# Patient Record
Sex: FEMALE | Race: OTHER | HISPANIC OR LATINO
[De-identification: names, ages, dates, MRNs, and addresses within clinical notes are randomized per-mention and may not be internally consistent; named-entity substitution may affect disease eponyms.]

---

## 2019-06-12 ENCOUNTER — APPOINTMENT (OUTPATIENT)
Dept: OBGYN | Facility: CLINIC | Age: 29
End: 2019-06-12
Payer: MEDICAID

## 2019-06-12 VITALS
WEIGHT: 113 LBS | SYSTOLIC BLOOD PRESSURE: 109 MMHG | HEART RATE: 77 BPM | HEIGHT: 60 IN | BODY MASS INDEX: 22.19 KG/M2 | DIASTOLIC BLOOD PRESSURE: 74 MMHG

## 2019-06-12 DIAGNOSIS — Z78.9 OTHER SPECIFIED HEALTH STATUS: ICD-10-CM

## 2019-06-12 DIAGNOSIS — Z80.9 FAMILY HISTORY OF MALIGNANT NEOPLASM, UNSPECIFIED: ICD-10-CM

## 2019-06-12 LAB
HCG UR QL: POSITIVE
QUALITY CONTROL: YES

## 2019-06-12 PROCEDURE — 99204 OFFICE O/P NEW MOD 45 MIN: CPT

## 2019-06-12 PROCEDURE — 81025 URINE PREGNANCY TEST: CPT

## 2019-06-12 NOTE — COUNSELING
[Nutrition] : nutrition [Breast Self Exam] : breast self exam [Exercise] : exercise [Vitamins/Supplements] : vitamins/supplements [STD (testing, results, tx)] : STD (testing, results, tx)

## 2019-06-13 LAB
C TRACH RRNA SPEC QL NAA+PROBE: NOT DETECTED
HPV HIGH+LOW RISK DNA PNL CVX: NOT DETECTED
N GONORRHOEA RRNA SPEC QL NAA+PROBE: NOT DETECTED
SOURCE TP AMPLIFICATION: NORMAL

## 2019-06-17 ENCOUNTER — APPOINTMENT (OUTPATIENT)
Dept: OBGYN | Facility: CLINIC | Age: 29
End: 2019-06-17
Payer: MEDICAID

## 2019-06-17 ENCOUNTER — NON-APPOINTMENT (OUTPATIENT)
Age: 29
End: 2019-06-17

## 2019-06-17 VITALS
HEIGHT: 60 IN | DIASTOLIC BLOOD PRESSURE: 69 MMHG | WEIGHT: 114.13 LBS | BODY MASS INDEX: 22.41 KG/M2 | HEART RATE: 80 BPM | SYSTOLIC BLOOD PRESSURE: 108 MMHG

## 2019-06-17 LAB — CYTOLOGY CVX/VAG DOC THIN PREP: NORMAL

## 2019-06-17 PROCEDURE — 99213 OFFICE O/P EST LOW 20 MIN: CPT | Mod: TH

## 2019-06-17 NOTE — PHYSICAL EXAM
[Awake] : awake [Alert] : alert [Soft] : soft [Oriented x3] : oriented to person, place, and time [Normal] : cervix [No Bleeding] : there was no active vaginal bleeding [Enlarged ___ wks] : enlarged [unfilled] ~Uweeks [Uterine Adnexae] : were not tender and not enlarged [Acute Distress] : no acute distress [Mass] : no breast mass [Nipple Discharge] : no nipple discharge [Tender] : non tender [Axillary LAD] : no axillary lymphadenopathy

## 2019-06-25 ENCOUNTER — APPOINTMENT (OUTPATIENT)
Dept: MATERNAL FETAL MEDICINE | Facility: CLINIC | Age: 29
End: 2019-06-25
Payer: MEDICAID

## 2019-06-25 ENCOUNTER — ASOB RESULT (OUTPATIENT)
Age: 29
End: 2019-06-25

## 2019-06-25 ENCOUNTER — APPOINTMENT (OUTPATIENT)
Dept: ANTEPARTUM | Facility: CLINIC | Age: 29
End: 2019-06-25
Payer: MEDICAID

## 2019-06-25 ENCOUNTER — LABORATORY RESULT (OUTPATIENT)
Age: 29
End: 2019-06-25

## 2019-06-25 VITALS
DIASTOLIC BLOOD PRESSURE: 62 MMHG | SYSTOLIC BLOOD PRESSURE: 96 MMHG | BODY MASS INDEX: 22.19 KG/M2 | HEART RATE: 68 BPM | WEIGHT: 113 LBS | HEIGHT: 60 IN

## 2019-06-25 DIAGNOSIS — Z83.49 FAMILY HISTORY OF OTHER ENDOCRINE, NUTRITIONAL AND METABOLIC DISEASES: ICD-10-CM

## 2019-06-25 DIAGNOSIS — Z82.49 FAMILY HISTORY OF ISCHEMIC HEART DISEASE AND OTHER DISEASES OF THE CIRCULATORY SYSTEM: ICD-10-CM

## 2019-06-25 DIAGNOSIS — N91.1 SECONDARY AMENORRHEA: ICD-10-CM

## 2019-06-25 PROCEDURE — 76801 OB US < 14 WKS SINGLE FETUS: CPT

## 2019-06-25 PROCEDURE — 36416 COLLJ CAPILLARY BLOOD SPEC: CPT

## 2019-06-25 PROCEDURE — 99203 OFFICE O/P NEW LOW 30 MIN: CPT | Mod: TH

## 2019-06-25 PROCEDURE — 76813 OB US NUCHAL MEAS 1 GEST: CPT

## 2019-06-25 NOTE — FAMILY HISTORY
[Age 35+ During Pregnancy] : not 35 or over during pregnancy [Reported Family History Of Birth Defects] : no congenital heart defects [Clifford-Sachs Carrier] : no Clifford-Sachs [Family History] : no mental retardation/autism [Reported Family History Of Genetic Disease] : no maternal metabolic disorder

## 2019-06-25 NOTE — SURGICAL HISTORY
[Fibroids] : no fibroids [Abn Paps] : no abnormal pap smears [STI's] : no STI's [Breast Disease] : no breast disease [Infertility] : no infertility [Cysts] : no cysts [OC Use] : no OC use

## 2019-06-25 NOTE — DISCUSSION/SUMMARY
[FreeTextEntry1] : She is 13 weeks and one day gestation by her last menstrual period dates.\par \par I told her that the majority of published studies have found that women who use oral contraceptives during early pregnancy have no increased risk for most types of major congenital malformations. I discussed the general topic of prenatal diagnosis. She was told that all pregnancies have a 2 to 3% risk of having a baby born with a birth defect, and a 1 to 2 % risk of having a baby born with developmental problems that cannot be diagnosed during pregnancy. I told her that there are two types of birth defects, structural and chromosomal. She was made aware that prenatal diagnosis is available to determine whether the fetus she is carrying has normal or abnormal chromosomes, and major fetal structural abnormalities. I discussed the various screening and diagnostic tests used for identifying fetal chromosomal abnormalities. I explained the difference between screening and diagnostic tests.  She was offered diagnostic testing with chorionic villus sampling or a genetic amniocentesis.  She was made aware of the small risk of miscarriage associated with the diagnostic procedures, the limitations of the procedures, and the alternative of not having the procedures.  All of her questions were answered. She decided not to have a diagnostic test at this time. She decided to have a first trimester screening test which was done today. She was offered genetic counseling. She consented to have genetic counseling and was scheduled to see our genetic counselor as soon as possible.  She was scheduled to have a detailed fetal anatomy ultrasound examination between 18 and 20 weeks of gestation to exclude major fetal structural abnormalities.   \par \par

## 2019-06-25 NOTE — PAST MEDICAL HISTORY
[Exposure To Gonorrhea] : no gonorrhea [Chlamydial Infections] : no chlamydia [Syphilis] : no syphilis [HIV Infection] : no HIV [Herpes Simplex] : no genital herpes [Human Papilloma Virus Infection] : no genital warts [Hepatitis, B Virus] : no Hepatitis B [Hepatitis, C Virus] : no Hepatitis C [Trichomoniasis] : no trichomoniasis

## 2019-06-25 NOTE — VITALS
[GA =___ Weeks] : which calculates to a GA of [unfilled] weeks [LMP (date): ___] : LMP was on [unfilled] [MARGOT by LMP (date): ___] : The calculated MARGOT by LMP is [unfilled] [GA= ___ Days] : and [unfilled] day(s) [By LMP] : this is the final MARGOT

## 2019-06-25 NOTE — ACTIVE PROBLEMS
[Diabetes Mellitus] : no diabetes mellitus [Autoimmune Disease] : no autoimmune disease [Hypertension] : no hypertension [Heart Disease] : no heart disease [Psychiatric Disorders] : no psychiatric disorders [Renal Disease] : no kidney disease, no UTI [Neurologic Disorder] : no neurologic disorder, no epilepsy [Depression] : no depression, no post partum depression [Thrombophlebitis] : no varicosities, no phlebitis [Hepatic Disorder] : no hepatitis, no liver disease [Trauma] : no trauma/violence [Thyroid Disorder] : no thyroid dysfunction

## 2019-06-25 NOTE — OB HISTORY
[Pregnancy History] : girl [___] : pregnancy complications occured [MARGOT: ___] : MARGOT: [unfilled] [LMP: ___] : LMP: [unfilled] [EGA: ___ wks] : EGA: [unfilled] wks [FreeTextEntry1] : First prenatal visit was June 17, 2019. No prenatal laboratory tests unavailable for my review. [Spontaneous] : Spontaneous conception [Definite:  ___ (Date)] : the last menstrual period was [unfilled] [Normal Amount/Duration] : was of a normal amount and duration [Regular Cycle Intervals] : periods have been regular [Spotting Between  Menses] : no spotting between menses [Menstrual Cramps] : menstrual cramps

## 2019-06-26 ENCOUNTER — APPOINTMENT (OUTPATIENT)
Dept: OBGYN | Facility: CLINIC | Age: 29
End: 2019-06-26
Payer: MEDICAID

## 2019-06-26 ENCOUNTER — TRANSCRIPTION ENCOUNTER (OUTPATIENT)
Age: 29
End: 2019-06-26

## 2019-06-26 ENCOUNTER — NON-APPOINTMENT (OUTPATIENT)
Age: 29
End: 2019-06-26

## 2019-06-26 VITALS
WEIGHT: 113 LBS | HEART RATE: 88 BPM | SYSTOLIC BLOOD PRESSURE: 104 MMHG | DIASTOLIC BLOOD PRESSURE: 72 MMHG | BODY MASS INDEX: 18.83 KG/M2 | HEIGHT: 65 IN

## 2019-06-26 DIAGNOSIS — Z3A.13 13 WEEKS GESTATION OF PREGNANCY: ICD-10-CM

## 2019-06-26 DIAGNOSIS — Z78.9 OTHER SPECIFIED HEALTH STATUS: ICD-10-CM

## 2019-06-26 PROCEDURE — 99213 OFFICE O/P EST LOW 20 MIN: CPT | Mod: TH

## 2019-06-26 RX ORDER — PRENATAL VIT NO.130/IRON/FOLIC 27MG-0.8MG
27-0.8 TABLET ORAL
Qty: 30 | Refills: 0 | Status: COMPLETED | COMMUNITY
Start: 2019-06-12

## 2019-06-27 ENCOUNTER — LABORATORY RESULT (OUTPATIENT)
Age: 29
End: 2019-06-27

## 2019-06-27 LAB
APPEARANCE: CLEAR
BASOPHILS # BLD AUTO: 0.05 K/UL
BASOPHILS NFR BLD AUTO: 0.5 %
BILIRUBIN URINE: NEGATIVE
BLOOD URINE: NEGATIVE
COLOR: YELLOW
EOSINOPHIL # BLD AUTO: 1 K/UL
EOSINOPHIL NFR BLD AUTO: 10.7 %
ESTIMATED AVERAGE GLUCOSE: 100 MG/DL
GLUCOSE QUALITATIVE U: NEGATIVE
HBA1C MFR BLD HPLC: 5.1 %
HBV SURFACE AG SER QL: NONREACTIVE
HCT VFR BLD CALC: 35.1 %
HCV AB SER QL: NONREACTIVE
HCV S/CO RATIO: 0.06 S/CO
HGB BLD-MCNC: 11.4 G/DL
HIV1+2 AB SPEC QL IA.RAPID: NONREACTIVE
IMM GRANULOCYTES NFR BLD AUTO: 0.1 %
KETONES URINE: NEGATIVE
LEUKOCYTE ESTERASE URINE: ABNORMAL
LYMPHOCYTES # BLD AUTO: 2.65 K/UL
LYMPHOCYTES NFR BLD AUTO: 28.3 %
MAN DIFF?: NORMAL
MCHC RBC-ENTMCNC: 28.1 PG
MCHC RBC-ENTMCNC: 32.5 GM/DL
MCV RBC AUTO: 86.5 FL
MONOCYTES # BLD AUTO: 0.55 K/UL
MONOCYTES NFR BLD AUTO: 5.9 %
NEUTROPHILS # BLD AUTO: 5.11 K/UL
NEUTROPHILS NFR BLD AUTO: 54.5 %
NITRITE URINE: NEGATIVE
PH URINE: 6
PLATELET # BLD AUTO: 305 K/UL
PROTEIN URINE: ABNORMAL
RBC # BLD: 4.06 M/UL
RBC # FLD: 14.9 %
SPECIFIC GRAVITY URINE: 1.03
TSH SERPL-ACNC: 0.71 UIU/ML
UROBILINOGEN URINE: NORMAL
WBC # FLD AUTO: 9.37 K/UL

## 2019-06-28 LAB
ABO + RH PNL BLD: NORMAL
B19V IGG SER QL IA: 0.3 INDEX
B19V IGG+IGM SER-IMP: NEGATIVE
B19V IGG+IGM SER-IMP: NORMAL
B19V IGM FLD-ACNC: 0.3
B19V IGM SER-ACNC: NEGATIVE
BLD GP AB SCN SERPL QL: NORMAL
CMV IGG SERPL QL: 3.8 U/ML
CMV IGG SERPL-IMP: POSITIVE
MEV IGG FLD QL IA: 68.1 AU/ML
MEV IGG+IGM SER-IMP: POSITIVE
MUV AB SER-ACNC: POSITIVE
MUV IGG SER QL IA: 23.6 AU/ML
T GONDII AB SER-IMP: NEGATIVE
T GONDII IGG SER QL: <3 IU/ML
T PALLIDUM AB SER QL IA: NEGATIVE
VZV AB TITR SER: POSITIVE
VZV IGG SER IF-ACNC: 432.6 INDEX

## 2019-07-01 LAB
FMR1 GENE MUT ANL BLD/T: NORMAL
M TB IFN-G BLD-IMP: NEGATIVE
QUANTIFERON TB PLUS MITOGEN MINUS NIL: >10 IU/ML
QUANTIFERON TB PLUS NIL: 0.02 IU/ML
QUANTIFERON TB PLUS TB1 MINUS NIL: 0 IU/ML
QUANTIFERON TB PLUS TB2 MINUS NIL: 0 IU/ML
RUBV IGG FLD-ACNC: 1.1 INDEX
RUBV IGG SER-IMP: POSITIVE

## 2019-07-03 ENCOUNTER — ASOB RESULT (OUTPATIENT)
Age: 29
End: 2019-07-03

## 2019-07-03 ENCOUNTER — APPOINTMENT (OUTPATIENT)
Dept: ANTEPARTUM | Facility: CLINIC | Age: 29
End: 2019-07-03

## 2019-07-03 ENCOUNTER — APPOINTMENT (OUTPATIENT)
Dept: MATERNAL FETAL MEDICINE | Facility: CLINIC | Age: 29
End: 2019-07-03
Payer: MEDICAID

## 2019-07-03 LAB
HGB A MFR BLD: 60.3 %
HGB A2 MFR BLD: 3.3 %
HGB FRACT BLD-IMP: NORMAL
HGB OTHER MFR BLD ELPH: 36.4 %
HGB S BLD QL SOLY: NEGATIVE

## 2019-07-03 PROCEDURE — 99215 OFFICE O/P EST HI 40 MIN: CPT | Mod: TH

## 2019-07-05 PROBLEM — Z3A.13 13 WEEKS GESTATION OF PREGNANCY: Status: RESOLVED | Noted: 2019-06-26 | Resolved: 2019-07-05

## 2019-07-05 LAB
AR GENE MUT ANL BLD/T: NEGATIVE
CFTR MUT TESTED BLD/T: NEGATIVE

## 2019-07-08 ENCOUNTER — APPOINTMENT (OUTPATIENT)
Dept: OBGYN | Facility: CLINIC | Age: 29
End: 2019-07-08
Payer: MEDICAID

## 2019-07-08 ENCOUNTER — NON-APPOINTMENT (OUTPATIENT)
Age: 29
End: 2019-07-08

## 2019-07-08 VITALS
HEIGHT: 65 IN | WEIGHT: 113 LBS | SYSTOLIC BLOOD PRESSURE: 110 MMHG | DIASTOLIC BLOOD PRESSURE: 88 MMHG | BODY MASS INDEX: 18.83 KG/M2

## 2019-07-08 DIAGNOSIS — Z3A.13 13 WEEKS GESTATION OF PREGNANCY: ICD-10-CM

## 2019-07-08 LAB
1ST TRIMESTER DATA: NORMAL
ADDENDUM DOC: NORMAL
AFP PNL SERPL: NORMAL
AFP SERPL-ACNC: NORMAL
CLINICAL BIOCHEMIST REVIEW: ABNORMAL
FREE BETA HCG 1ST TRIMESTER: NORMAL
Lab: NORMAL
NASAL BONE: PRESENT
NOTES NTD: NORMAL
NT: NORMAL
PAPP-A SERPL-ACNC: NORMAL
TRISOMY 18/3: NORMAL

## 2019-07-08 PROCEDURE — 99213 OFFICE O/P EST LOW 20 MIN: CPT | Mod: TH

## 2019-07-10 LAB — BACTERIA UR CULT: NORMAL

## 2019-07-29 ENCOUNTER — APPOINTMENT (OUTPATIENT)
Dept: OBGYN | Facility: CLINIC | Age: 29
End: 2019-07-29
Payer: MEDICAID

## 2019-07-29 ENCOUNTER — NON-APPOINTMENT (OUTPATIENT)
Age: 29
End: 2019-07-29

## 2019-07-29 ENCOUNTER — APPOINTMENT (OUTPATIENT)
Dept: ANTEPARTUM | Facility: CLINIC | Age: 29
End: 2019-07-29

## 2019-07-29 VITALS
HEIGHT: 61 IN | SYSTOLIC BLOOD PRESSURE: 104 MMHG | HEART RATE: 82 BPM | BODY MASS INDEX: 21.99 KG/M2 | DIASTOLIC BLOOD PRESSURE: 73 MMHG | WEIGHT: 116.5 LBS

## 2019-07-29 PROCEDURE — 99213 OFFICE O/P EST LOW 20 MIN: CPT | Mod: TH

## 2019-08-01 LAB
1ST TRIMESTER DATA: NORMAL
2ND TRIMESTER DATA: NORMAL
AFP PNL SERPL: NORMAL
AFP SERPL-ACNC: NORMAL
AFP SERPL-ACNC: NORMAL
B-HCG FREE SERPL-MCNC: NORMAL
CLINICAL BIOCHEMIST REVIEW: NORMAL
FREE BETA HCG 1ST TRIMESTER: NORMAL
INHIBIN A SERPL-MCNC: NORMAL
NASAL BONE: PRESENT
NOTES NTD: NORMAL
NT: NORMAL
PAPP-A SERPL-ACNC: NORMAL
U ESTRIOL SERPL-SCNC: NORMAL

## 2019-08-12 ENCOUNTER — ASOB RESULT (OUTPATIENT)
Age: 29
End: 2019-08-12

## 2019-08-12 ENCOUNTER — APPOINTMENT (OUTPATIENT)
Dept: ANTEPARTUM | Facility: CLINIC | Age: 29
End: 2019-08-12
Payer: MEDICAID

## 2019-08-12 PROCEDURE — 76817 TRANSVAGINAL US OBSTETRIC: CPT

## 2019-08-12 PROCEDURE — 76811 OB US DETAILED SNGL FETUS: CPT

## 2019-08-19 ENCOUNTER — APPOINTMENT (OUTPATIENT)
Dept: OBGYN | Facility: CLINIC | Age: 29
End: 2019-08-19
Payer: MEDICAID

## 2019-08-19 ENCOUNTER — NON-APPOINTMENT (OUTPATIENT)
Age: 29
End: 2019-08-19

## 2019-08-19 VITALS
HEART RATE: 80 BPM | BODY MASS INDEX: 22.67 KG/M2 | SYSTOLIC BLOOD PRESSURE: 99 MMHG | DIASTOLIC BLOOD PRESSURE: 69 MMHG | HEIGHT: 61 IN | WEIGHT: 120.06 LBS

## 2019-08-19 PROCEDURE — 99213 OFFICE O/P EST LOW 20 MIN: CPT | Mod: TH

## 2019-08-29 ENCOUNTER — OUTPATIENT (OUTPATIENT)
Dept: INPATIENT UNIT | Facility: HOSPITAL | Age: 29
LOS: 1 days | End: 2019-08-29
Payer: COMMERCIAL

## 2019-08-29 VITALS
DIASTOLIC BLOOD PRESSURE: 69 MMHG | TEMPERATURE: 99 F | HEART RATE: 79 BPM | RESPIRATION RATE: 16 BRPM | SYSTOLIC BLOOD PRESSURE: 109 MMHG

## 2019-08-29 VITALS — DIASTOLIC BLOOD PRESSURE: 65 MMHG | HEART RATE: 78 BPM | SYSTOLIC BLOOD PRESSURE: 101 MMHG

## 2019-08-29 DIAGNOSIS — O47.02 FALSE LABOR BEFORE 37 COMPLETED WEEKS OF GESTATION, SECOND TRIMESTER: ICD-10-CM

## 2019-08-29 DIAGNOSIS — Z3A.18 18 WEEKS GESTATION OF PREGNANCY: ICD-10-CM

## 2019-08-29 DIAGNOSIS — Z3A.15 15 WEEKS GESTATION OF PREGNANCY: ICD-10-CM

## 2019-08-29 DIAGNOSIS — Z3A.20 20 WEEKS GESTATION OF PREGNANCY: ICD-10-CM

## 2019-08-29 PROCEDURE — 59025 FETAL NON-STRESS TEST: CPT

## 2019-08-29 PROCEDURE — G0463: CPT

## 2019-08-29 NOTE — OB PROVIDER TRIAGE NOTE - HISTORY OF PRESENT ILLNESS
OLINDA LARSON is a 29yF  @22w6d by second trim sono (20wks). She presents to L&D for sharp pelvic pain with onset 1 day prior. The pain is 6-8/10 in intensity, it is located midline at the pelvic area without radiation. She reports it is worse with sitting upright and does not endorse alleviating factors. She denies any precipitating events such as vigorous exercise or sexual intercourse. She most recently engaged in intercourse 2 days prior to presentation.    Ctx: Denies  Mvmt: +  LOF: Denies  Vaginal bleeding: Denies    ROS: Endorses SOB/GERD with onset of several weeks ago. Otherwise negative.     Patient has been following up with [facility] for pre- care. No complications throughout current pregnancy.     PMH: Fe Deficient Anemia  PSH: None   Past OB  - 2006  Term (around 7lbs)  -    (35 wks (around 6 lbs))  -    (35 wks (around 7 lbs))    pGYN: Denies F/C/STI, 0 abn pap  Meds: PNV  All: NKDA  Social Hx: Denies alcohol, tobacco, drug use

## 2019-08-29 NOTE — OB PROVIDER TRIAGE NOTE - NSHPPHYSICALEXAM_GEN_ALL_CORE
PHYSICAL EXAM  Vital Signs Last 24 Hrs  T(C): 37 (29 Aug 2019 17:40), Max: 37 (29 Aug 2019 17:40)  T(F): 98.6 (29 Aug 2019 17:40), Max: 98.6 (29 Aug 2019 17:40)  HR: 79 (29 Aug 2019 17:41) (79 - 79)  BP: 109/69 (29 Aug 2019 17:41) (109/69 - 109/69)  BP(mean): --  RR: 16 (29 Aug 2019 17:40) (16 - 16)  SpO2: --    Gen: NAD  Resp: CTABL  Abdomen: Soft gravid abdomen, Minimal tenderness at midline pelvis  Extremities: No edema    FHR: 140    [place]: No ctx PHYSICAL EXAM  Vital Signs Last 24 Hrs  T(C): 37 (29 Aug 2019 17:40), Max: 37 (29 Aug 2019 17:40)  T(F): 98.6 (29 Aug 2019 17:40), Max: 98.6 (29 Aug 2019 17:40)  HR: 79 (29 Aug 2019 17:41) (79 - 79)  BP: 109/69 (29 Aug 2019 17:41) (109/69 - 109/69)  BP(mean): --  RR: 16 (29 Aug 2019 17:40) (16 - 16)  SpO2: --    Gen: NAD  Resp: CTABL  Abdomen: Soft gravid abdomen, Minimal tenderness at midline pelvis  Extremities: No edema    FHR: 140    [place]: No ctx      Cervical length = 3.17 cm

## 2019-08-29 NOTE — OB PROVIDER TRIAGE NOTE - NSOBPROVIDERNOTE_OBGYN_ALL_OB_FT
MARSHA OLINDA is a 29yF  @22w6d by second trim sono (20wks). She presents to L&D for sharp pelvic pain with onset 1 day prior. She most recently engaged in intercourse 2 days prior to presentation.    Plan  1. TVUS - Cervical Length  2. FFN not performed due to recent sexual intercourse OLINDA LARSON is a 29yF  @22w6d by second trim sono (20wks). She presents to L&D for sharp pelvic pain with onset 1 day prior. She most recently engaged in intercourse 2 days prior to presentation.     Plan  cervical length is not short. Pt to keep appointment on . Pelvic reset until she sees Dr. Lebron.

## 2019-09-05 ENCOUNTER — OUTPATIENT (OUTPATIENT)
Dept: INPATIENT UNIT | Facility: HOSPITAL | Age: 29
LOS: 1 days | End: 2019-09-05
Payer: COMMERCIAL

## 2019-09-05 VITALS — SYSTOLIC BLOOD PRESSURE: 98 MMHG | DIASTOLIC BLOOD PRESSURE: 57 MMHG | HEART RATE: 74 BPM

## 2019-09-05 VITALS — RESPIRATION RATE: 14 BRPM | TEMPERATURE: 98 F

## 2019-09-05 DIAGNOSIS — Z98.890 OTHER SPECIFIED POSTPROCEDURAL STATES: Chronic | ICD-10-CM

## 2019-09-05 DIAGNOSIS — O47.02 FALSE LABOR BEFORE 37 COMPLETED WEEKS OF GESTATION, SECOND TRIMESTER: ICD-10-CM

## 2019-09-05 LAB
ALBUMIN SERPL ELPH-MCNC: 3.4 G/DL — SIGNIFICANT CHANGE UP (ref 3.3–5.2)
ALP SERPL-CCNC: 65 U/L — SIGNIFICANT CHANGE UP (ref 40–120)
ALT FLD-CCNC: 34 U/L — HIGH
ANION GAP SERPL CALC-SCNC: 11 MMOL/L — SIGNIFICANT CHANGE UP (ref 5–17)
AST SERPL-CCNC: 28 U/L — SIGNIFICANT CHANGE UP
BASOPHILS # BLD AUTO: 0.05 K/UL — SIGNIFICANT CHANGE UP (ref 0–0.2)
BASOPHILS NFR BLD AUTO: 0.4 % — SIGNIFICANT CHANGE UP (ref 0–2)
BILIRUB SERPL-MCNC: <0.2 MG/DL — LOW (ref 0.4–2)
BUN SERPL-MCNC: 10 MG/DL — SIGNIFICANT CHANGE UP (ref 8–20)
CALCIUM SERPL-MCNC: 9.1 MG/DL — SIGNIFICANT CHANGE UP (ref 8.6–10.2)
CHLORIDE SERPL-SCNC: 106 MMOL/L — SIGNIFICANT CHANGE UP (ref 98–107)
CO2 SERPL-SCNC: 18 MMOL/L — LOW (ref 22–29)
CREAT SERPL-MCNC: 0.34 MG/DL — LOW (ref 0.5–1.3)
EOSINOPHIL # BLD AUTO: 0.62 K/UL — HIGH (ref 0–0.5)
EOSINOPHIL NFR BLD AUTO: 5.4 % — SIGNIFICANT CHANGE UP (ref 0–6)
GLUCOSE SERPL-MCNC: 80 MG/DL — SIGNIFICANT CHANGE UP (ref 70–115)
HCT VFR BLD CALC: 29.8 % — LOW (ref 34.5–45)
HGB BLD-MCNC: 9 G/DL — LOW (ref 11.5–15.5)
IMM GRANULOCYTES NFR BLD AUTO: 1.4 % — SIGNIFICANT CHANGE UP (ref 0–1.5)
LYMPHOCYTES # BLD AUTO: 1.86 K/UL — SIGNIFICANT CHANGE UP (ref 1–3.3)
LYMPHOCYTES # BLD AUTO: 16.1 % — SIGNIFICANT CHANGE UP (ref 13–44)
MCHC RBC-ENTMCNC: 26.6 PG — LOW (ref 27–34)
MCHC RBC-ENTMCNC: 30.2 GM/DL — LOW (ref 32–36)
MCV RBC AUTO: 88.2 FL — SIGNIFICANT CHANGE UP (ref 80–100)
MONOCYTES # BLD AUTO: 0.69 K/UL — SIGNIFICANT CHANGE UP (ref 0–0.9)
MONOCYTES NFR BLD AUTO: 6 % — SIGNIFICANT CHANGE UP (ref 2–14)
NEUTROPHILS # BLD AUTO: 8.17 K/UL — HIGH (ref 1.8–7.4)
NEUTROPHILS NFR BLD AUTO: 70.7 % — SIGNIFICANT CHANGE UP (ref 43–77)
PLATELET # BLD AUTO: 275 K/UL — SIGNIFICANT CHANGE UP (ref 150–400)
POTASSIUM SERPL-MCNC: 4.1 MMOL/L — SIGNIFICANT CHANGE UP (ref 3.5–5.3)
POTASSIUM SERPL-SCNC: 4.1 MMOL/L — SIGNIFICANT CHANGE UP (ref 3.5–5.3)
PROT SERPL-MCNC: 6.4 G/DL — LOW (ref 6.6–8.7)
RBC # BLD: 3.38 M/UL — LOW (ref 3.8–5.2)
RBC # FLD: 15.9 % — HIGH (ref 10.3–14.5)
SODIUM SERPL-SCNC: 135 MMOL/L — SIGNIFICANT CHANGE UP (ref 135–145)
WBC # BLD: 11.55 K/UL — HIGH (ref 3.8–10.5)
WBC # FLD AUTO: 11.55 K/UL — HIGH (ref 3.8–10.5)

## 2019-09-05 PROCEDURE — 85027 COMPLETE CBC AUTOMATED: CPT

## 2019-09-05 PROCEDURE — 36415 COLL VENOUS BLD VENIPUNCTURE: CPT

## 2019-09-05 PROCEDURE — 76805 OB US >/= 14 WKS SNGL FETUS: CPT

## 2019-09-05 PROCEDURE — 80053 COMPREHEN METABOLIC PANEL: CPT

## 2019-09-05 PROCEDURE — 59025 FETAL NON-STRESS TEST: CPT

## 2019-09-05 PROCEDURE — 76805 OB US >/= 14 WKS SNGL FETUS: CPT | Mod: 26

## 2019-09-05 PROCEDURE — G0463: CPT

## 2019-09-05 PROCEDURE — 93010 ELECTROCARDIOGRAM REPORT: CPT

## 2019-09-05 PROCEDURE — 93005 ELECTROCARDIOGRAM TRACING: CPT

## 2019-09-05 PROCEDURE — 99223 1ST HOSP IP/OBS HIGH 75: CPT

## 2019-09-05 RX ORDER — DOCUSATE SODIUM 100 MG
1 CAPSULE ORAL
Qty: 30 | Refills: 0
Start: 2019-09-05 | End: 2019-10-04

## 2019-09-05 NOTE — OB PROVIDER TRIAGE NOTE - NSHPLABSRESULTS_GEN_ALL_CORE
CBC: Pending  CMP: Pending  Sono: Pending  EKG: Pending 9.0    11.55 )-----------( 275      ( 05 Sep 2019 16:16 )             29.8   09-05    135  |  106  |  10.0  ----------------------------<  80  4.1   |  18.0<L>  |  0.34<L>    Ca    9.1      05 Sep 2019 16:16    TPro  6.4<L>  /  Alb  3.4  /  TBili  <0.2<L>  /  DBili  x   /  AST  28  /  ALT  34<H>  /  AlkPhos  65  09-05      Sono: Pending  EKG: Pending

## 2019-09-05 NOTE — OB PROVIDER TRIAGE NOTE - NSOBPROVIDERNOTE_OBGYN_ALL_OB_FT
Ms Roslyn Davila is a 30yo  at 23w and 6d presenting to L&D s/p loss of consciousness at a  at 13:00 today.    -Continue to monitor  - Ms Roslyn Davila is a 30yo  at 23wk6d presenting to L&D s/p loss of consciousness at a  at 13:00 today.     Plan:  - Patient currently stable at bedside; currently reports good fetal movement  - Will send for CBC, CMP  - Plan for EKG and OB ultrasound   - Will continue to monitor Ms Roslyn Davila is a 28yo  at 23wk6d presenting to L&D s/p loss of consciousness at a  at 13:00 today.     Plan:  - Patient currently stable at bedside; currently reports good fetal movement  - CBC, CMP within normal limits  - Neurology consult: No neurological issues at this time  - Plan for EKG and OB ultrasound   - Will continue to monitor Ms Roslyn Davila is a 28yo  at 23wk6d presenting to L&D s/p loss of consciousness at a  at 13:00 today.     Plan:  - Patient currently stable at bedside; currently reports good fetal movement  - CBC, CMP  - EKG  - OB ultrasound   - MFM consult  - Will continue to monitor    Addendum:  AVVS  OB SONO: WNL  EKG: sinus rhythm  CBC, CMP - anemia, otherwise within normal limits in pregnancy  MFM on call was reached with the results of the tests, who recommended neurology consult to rule out neurologic causes for syncopal episode.  Pt was examined by Dr. Schrader the neurologist on call, who ruled out any neurological pathology and suggested vasovagal episode as a cause for syncopal episode given patient's symptoms before the episode.  Pt was discharged with PO Iron for anemia and with recommendations to increase po sodium intake to increase the blood pressure.  Pt will be seen by Dr. Lebron next Monday.  Dr. Ortiz signed off on the tracing.

## 2019-09-05 NOTE — OB RN TRIAGE NOTE - NS_PAINMANGEPLANS_OBGYN_ALL_OB
"Writer received a call from pts mother Dunia Crowell. Pt's mother expressed frustration about the pt having the right to refuse visits. Prior to this call pt had refused a visit from his father. Pt's mother indicates this is \"counterproductive considering his diagnosis of RAD.\" Pts mother expressed further frustration about the pt's refusing to visit as the planned discharge disposition is home. Writer sympathized with pt's mother and encouraged her to reach out to the pt's doctor to schedule a family therapy session to help facilitate this. Pts mother expressed they have gone through \"9 years of extensive therapy\" but was willing to entertain the idea of sessions here prior to discharge.  In addition, pt's mother indicated certain privileges (legos and a drone) which were to be negotiated during the visit the pt refused with father, will now be given to the pt's siblings due to pt refusing the visit.    " Breathing/Relaxation

## 2019-09-05 NOTE — CONSULT NOTE ADULT - SUBJECTIVE AND OBJECTIVE BOX
[facility]                                     Neurology at [place]                                 Macrina Rodriguez, & Florentin                                  [address]                                        [place], [place], [address]                                             [phone]    CC: syncope  HPI: The patient is a 29y Female (23 6/7 weeks pregnant) who presented with syncope.  She was standing in line at a [employer] and felt a chill-wave come over her, felt light-headed and her vision started to fade like a curtain falling and her hearing faded out as well.  Someone in line wa able to catch her from falling. She awoke sitting in a chair.  There was no convulsions, tongue bite or incontinence.  She is feeling well now.  Neuro evaluation is requested.    PAST MEDICAL & SURGICAL HISTORY:  Anemia  Spontaneous : x1 2017  History of D&C: 2017      MEDICATIONS  (STANDING):    MEDICATIONS  (PRN):      Allergies    No Known Allergies    Intolerances        SOCIAL HISTORY:  no tob,   no alcohol   no drugs    FAMILY HISTORY:  n/c      ROS: 14 point ROS negative other than what is present in HPI or below  syncope earlier, no complaints now    Vital Signs Last 24 Hrs  T(C): 36.9 (05 Sep 2019 15:01), Max: 36.9 (05 Sep 2019 15:01)  T(F): 98.42 (05 Sep 2019 15:01), Max: 98.42 (05 Sep 2019 15:01)  HR: 74 (05 Sep 2019 16:48) (74 - 82)  BP: 98/57 (05 Sep 2019 16:48) (96/60 - 103/65)  BP(mean): --  RR: 18 (05 Sep 2019 15:44) (14 - 18)  SpO2: 100% (05 Sep 2019 15:08) (99% - 100%)      General: NAD    Detailed Neurologic Exam:    Mental status: The patient is awake and alert and has normal attention span.  The patient is fully oriented in 3 spheres. The patient is oriented to current events. The patient is able to name objects, follow commands, repeat sentences.    Cranial nerves: Pupils equal and react symmetrically to light. There is no visual field deficit to confrontation. Extraocular motion is full with no nystagmus. There is no ptosis. Facial sensation is intact. Facial musculature is symmetric. Palate elevates symmetrically.  Tongue is midline.    Motor: There is normal bulk and tone.  There is no tremor.  Strength is 5/5 in the right arm and leg.   Strength is 5/5 in the left arm and leg.    Sensation: Intact to light touch and pin in 4 extremities    Reflexes: 2+ throughout and plantar responses are flexor.    Cerebellar: There is no dysmetria on finger to nose testing.    Gait : deferred    LABS:                         9.0    11.55 )-----------( 275      ( 05 Sep 2019 16:16 )             29.8           135  |  106  |  10.0  ----------------------------<  80  4.1   |  18.0<L>  |  0.34<L>    Ca    9.1      05 Sep 2019 16:16    TPro  6.4<L>  /  Alb  3.4  /  TBili  <0.2<L>  /  DBili  x   /  AST  28  /  ALT  34<H>  /  AlkPhos  65  -      RADIOLOGY & ADDITIONAL STUDIES (independently reviewed unless otherwise noted):  no neuro studies

## 2019-09-05 NOTE — OB PROVIDER TRIAGE NOTE - HISTORY OF PRESENT ILLNESS
Ms Roslyn Davila is a 30yo  at 23w and 6d presenting to L&D s/p loss of consciousness at a  at 13:00 today. Patient reports that prior to passing out she had a headache, loss of hearing and loss of vision. She reports she does not know the duration of her loss of consciousness, but when she awakened she was in a chair. Patient reports that someone caught her and she did not fall to the floor. She denies any LOF, contractions, bleeding and endorses fetal movement. Patient also reports some sharp abdominal discomfort since last week, which was attributed to stretching of her uterine ligaments. Patient has no other complaints at this time.    PMH: Anemia  PSH: Denies  PGYN: Denies  Allergies: NKDA  Meds: PNV Ms Roslyn Davila is a 28yo  at [id number] presenting to L&D s/p loss of consciousness at a  at 13:00 today.  Patient reports that prior to passing out she experienced a cold chills passing through her body, and tunneling of her vision at which time she believes she lost consciousness. Patient reports that someone caught her as she was falling and she did not suffer any abdominal trauma. She reports she does not know the duration of her loss of consciousness, but when she awakened she was in a chair. She denies any LOF, contractions, bleeding and endorses good fetal movement. Patient has no other complaints at this time.    PMH: Fe Deficient Anemia  PSH: None   Past OB  - 2006  Term (around 7lbs)  -    (35 wks (around 6 lbs))  -    (35 wks (around 7 lbs))  -  MAB (s/p d&c)    pGYN: Denies F/C/STI, 0 abn pap  Meds: PNV  All: NKDA  Social Hx: Denies alcohol, tobacco, drug use

## 2019-09-05 NOTE — CONSULT NOTE ADULT - ASSESSMENT
The patient is a 29y Female who is followed by neurology because of syncope    Syncope  possibly vasovagal  is hypotensive in unit  Was standing in line, lightheaded  no signs of seizure    liberalize salt in diet  consider compression stockings if edema occurs  if recurs consider cardiology eval,     no inpatient neuro workup suggested at this time    d/w OB resident on call    Thank you for allowing me to participate in the care of your patient    Jarett Schrader MD, PhD   [phone]

## 2019-09-05 NOTE — OB PROVIDER TRIAGE NOTE - NS_OBGYNHISTORY_OBGYN_ALL_OB_FT
- 2006  Term (around 7lbs)  -    (35 wks (around 6 lbs))  -    (35 wks (around 7 lbs)  - MAB and D&C

## 2019-09-05 NOTE — OB PROVIDER TRIAGE NOTE - NSHPPHYSICALEXAM_GEN_ALL_CORE
Vital Signs Last 24 Hrs  T(C): 36.8 (05 Sep 2019 13:50), Max: 36.8 (05 Sep 2019 13:46)  T(F): 98.2 (05 Sep 2019 13:50), Max: 98.24 (05 Sep 2019 13:46)  HR: 76 (05 Sep 2019 13:50) (76 - 76)  BP: 103/65 (05 Sep 2019 13:50) (103/65 - 103/65)  RR: 14 (05 Sep 2019 13:50) (14 - 14)    Gen: Well-appearing, NAD  Abd: Gravid, nontender to palpation  Extremities: No edema, tenderness, redness  FHT: 145 moderate variability, cat 1   TOCO: no ctx Vital Signs Last 24 Hrs  T(C): 36.8 (05 Sep 2019 13:50), Max: 36.8 (05 Sep 2019 13:46)  T(F): 98.2 (05 Sep 2019 13:50), Max: 98.24 (05 Sep 2019 13:46)  HR: 76 (05 Sep 2019 13:50) (76 - 76)  BP: 103/65 (05 Sep 2019 13:50) (103/65 - 103/65)  RR: 14 (05 Sep 2019 13:50) (14 - 14)    Gen: Well-appearing, NAD  Abd: Gravid, nontender to palpation  Extremities: No edema, tenderness, redness    FHT: 145bpm, periods of minimal variability   TOCO: uterine irritability

## 2019-09-09 ENCOUNTER — NON-APPOINTMENT (OUTPATIENT)
Age: 29
End: 2019-09-09

## 2019-09-09 ENCOUNTER — APPOINTMENT (OUTPATIENT)
Dept: OBGYN | Facility: CLINIC | Age: 29
End: 2019-09-09
Payer: MEDICAID

## 2019-09-09 VITALS
SYSTOLIC BLOOD PRESSURE: 101 MMHG | WEIGHT: 123.38 LBS | HEART RATE: 88 BPM | DIASTOLIC BLOOD PRESSURE: 66 MMHG | BODY MASS INDEX: 23.29 KG/M2 | HEIGHT: 61 IN

## 2019-09-09 PROCEDURE — 99213 OFFICE O/P EST LOW 20 MIN: CPT | Mod: TH

## 2019-09-10 PROBLEM — D64.9 ANEMIA, UNSPECIFIED: Chronic | Status: ACTIVE | Noted: 2019-09-05

## 2019-09-10 PROBLEM — O03.9 COMPLETE OR UNSPECIFIED SPONTANEOUS ABORTION WITHOUT COMPLICATION: Chronic | Status: ACTIVE | Noted: 2019-09-05

## 2019-09-11 LAB
BILIRUB UR QL STRIP: NORMAL
GLUCOSE UR-MCNC: NORMAL
HCG UR QL: 0.2 EU/DL
HGB UR QL STRIP.AUTO: NORMAL
KETONES UR-MCNC: NORMAL
LEUKOCYTE ESTERASE UR QL STRIP: NORMAL
NITRITE UR QL STRIP: NORMAL
PH UR STRIP: 5.5
PROT UR STRIP-MCNC: NORMAL
SP GR UR STRIP: 1.01

## 2019-09-27 ENCOUNTER — NON-APPOINTMENT (OUTPATIENT)
Age: 29
End: 2019-09-27

## 2019-09-27 ENCOUNTER — APPOINTMENT (OUTPATIENT)
Dept: OBGYN | Facility: CLINIC | Age: 29
End: 2019-09-27
Payer: MEDICAID

## 2019-09-27 VITALS
DIASTOLIC BLOOD PRESSURE: 65 MMHG | HEIGHT: 61 IN | BODY MASS INDEX: 23.81 KG/M2 | WEIGHT: 126.13 LBS | SYSTOLIC BLOOD PRESSURE: 101 MMHG

## 2019-09-27 LAB
BASOPHILS # BLD AUTO: 0.03 K/UL
BASOPHILS NFR BLD AUTO: 0.4 %
BILIRUB UR QL STRIP: NORMAL
EOSINOPHIL # BLD AUTO: 0.38 K/UL
EOSINOPHIL NFR BLD AUTO: 4.7 %
GLUCOSE 1H P 50 G GLC PO SERPL-MCNC: 135 MG/DL
GLUCOSE UR-MCNC: NORMAL
HCG UR QL: 0.2 EU/DL
HCT VFR BLD CALC: 30.7 %
HGB BLD-MCNC: 9.2 G/DL
HGB UR QL STRIP.AUTO: NORMAL
IMM GRANULOCYTES NFR BLD AUTO: 0.9 %
KETONES UR-MCNC: NORMAL
LEUKOCYTE ESTERASE UR QL STRIP: NORMAL
LYMPHOCYTES # BLD AUTO: 2.07 K/UL
LYMPHOCYTES NFR BLD AUTO: 25.4 %
MAN DIFF?: NORMAL
MCHC RBC-ENTMCNC: 27.1 PG
MCHC RBC-ENTMCNC: 30 GM/DL
MCV RBC AUTO: 90.3 FL
MONOCYTES # BLD AUTO: 0.55 K/UL
MONOCYTES NFR BLD AUTO: 6.7 %
NEUTROPHILS # BLD AUTO: 5.06 K/UL
NEUTROPHILS NFR BLD AUTO: 61.9 %
NITRITE UR QL STRIP: NORMAL
PH UR STRIP: 6
PLATELET # BLD AUTO: 257 K/UL
PROT UR STRIP-MCNC: NORMAL
RBC # BLD: 3.4 M/UL
RBC # FLD: 16.3 %
SP GR UR STRIP: 1.03
WBC # FLD AUTO: 8.16 K/UL

## 2019-09-27 PROCEDURE — 99213 OFFICE O/P EST LOW 20 MIN: CPT | Mod: TH

## 2019-10-16 DIAGNOSIS — Z3A.28 28 WEEKS GESTATION OF PREGNANCY: ICD-10-CM

## 2019-10-16 DIAGNOSIS — Z3A.24 24 WEEKS GESTATION OF PREGNANCY: ICD-10-CM

## 2019-10-16 LAB
GLUCOSE 1H P 100 G GLC PO SERPL-MCNC: 150 MG/DL
GLUCOSE 2H P CHAL SERPL-MCNC: 105 MG/DL
GLUCOSE 3H P CHAL SERPL-MCNC: 108 MG/DL
GLUCOSE BS SERPL-MCNC: 76 MG/DL

## 2019-10-18 ENCOUNTER — NON-APPOINTMENT (OUTPATIENT)
Age: 29
End: 2019-10-18

## 2019-10-18 ENCOUNTER — APPOINTMENT (OUTPATIENT)
Dept: OBGYN | Facility: CLINIC | Age: 29
End: 2019-10-18
Payer: MEDICAID

## 2019-10-18 VITALS
SYSTOLIC BLOOD PRESSURE: 100 MMHG | BODY MASS INDEX: 24.35 KG/M2 | DIASTOLIC BLOOD PRESSURE: 67 MMHG | WEIGHT: 129 LBS | HEIGHT: 61 IN

## 2019-10-18 PROCEDURE — 99213 OFFICE O/P EST LOW 20 MIN: CPT | Mod: TH

## 2019-10-21 LAB
BILIRUB UR QL STRIP: NEGATIVE
GLUCOSE UR-MCNC: NEGATIVE
HCG UR QL: 0.2 EU/DL
HGB UR QL STRIP.AUTO: NEGATIVE
KETONES UR-MCNC: NEGATIVE
LEUKOCYTE ESTERASE UR QL STRIP: NEGATIVE
NITRITE UR QL STRIP: NEGATIVE
PH UR STRIP: 6
PROT UR STRIP-MCNC: NORMAL
SP GR UR STRIP: 1.03

## 2019-11-04 ENCOUNTER — APPOINTMENT (OUTPATIENT)
Dept: ANTEPARTUM | Facility: CLINIC | Age: 29
End: 2019-11-04
Payer: MEDICAID

## 2019-11-04 ENCOUNTER — ASOB RESULT (OUTPATIENT)
Age: 29
End: 2019-11-04

## 2019-11-04 PROCEDURE — 76816 OB US FOLLOW-UP PER FETUS: CPT

## 2019-11-05 ENCOUNTER — LABORATORY RESULT (OUTPATIENT)
Age: 29
End: 2019-11-05

## 2019-11-05 ENCOUNTER — APPOINTMENT (OUTPATIENT)
Dept: OBGYN | Facility: CLINIC | Age: 29
End: 2019-11-05
Payer: MEDICAID

## 2019-11-05 ENCOUNTER — NON-APPOINTMENT (OUTPATIENT)
Age: 29
End: 2019-11-05

## 2019-11-05 VITALS
DIASTOLIC BLOOD PRESSURE: 63 MMHG | BODY MASS INDEX: 25.22 KG/M2 | SYSTOLIC BLOOD PRESSURE: 102 MMHG | WEIGHT: 133.56 LBS | HEART RATE: 102 BPM | HEIGHT: 61 IN

## 2019-11-05 DIAGNOSIS — Z86.19 PERSONAL HISTORY OF OTHER INFECTIOUS AND PARASITIC DISEASES: ICD-10-CM

## 2019-11-05 DIAGNOSIS — Z3A.29 29 WEEKS GESTATION OF PREGNANCY: ICD-10-CM

## 2019-11-05 LAB
BILIRUB UR QL STRIP: NORMAL
GLUCOSE UR-MCNC: NORMAL
HCG UR QL: 0.2 EU/DL
HGB UR QL STRIP.AUTO: NORMAL
KETONES UR-MCNC: NORMAL
LEUKOCYTE ESTERASE UR QL STRIP: ABNORMAL
NITRITE UR QL STRIP: NORMAL
PH UR STRIP: 6
PROT UR STRIP-MCNC: NORMAL
SP GR UR STRIP: 1.02

## 2019-11-05 PROCEDURE — 99213 OFFICE O/P EST LOW 20 MIN: CPT | Mod: TH

## 2019-11-06 ENCOUNTER — TRANSCRIPTION ENCOUNTER (OUTPATIENT)
Age: 29
End: 2019-11-06

## 2019-11-06 LAB
CANDIDA VAG CYTO: DETECTED
G VAGINALIS+PREV SP MTYP VAG QL MICRO: NOT DETECTED
T VAGINALIS VAG QL WET PREP: NOT DETECTED

## 2019-11-07 ENCOUNTER — MESSAGE (OUTPATIENT)
Age: 29
End: 2019-11-07

## 2019-11-08 ENCOUNTER — OUTPATIENT (OUTPATIENT)
Dept: INPATIENT UNIT | Facility: HOSPITAL | Age: 29
LOS: 1 days | End: 2019-11-08
Payer: COMMERCIAL

## 2019-11-08 VITALS — TEMPERATURE: 99 F | SYSTOLIC BLOOD PRESSURE: 101 MMHG | DIASTOLIC BLOOD PRESSURE: 62 MMHG | HEART RATE: 90 BPM

## 2019-11-08 VITALS — RESPIRATION RATE: 16 BRPM | TEMPERATURE: 99 F

## 2019-11-08 DIAGNOSIS — O47.03 FALSE LABOR BEFORE 37 COMPLETED WEEKS OF GESTATION, THIRD TRIMESTER: ICD-10-CM

## 2019-11-08 DIAGNOSIS — Z98.890 OTHER SPECIFIED POSTPROCEDURAL STATES: Chronic | ICD-10-CM

## 2019-11-08 LAB
APPEARANCE UR: CLEAR — SIGNIFICANT CHANGE UP
BILIRUB UR-MCNC: NEGATIVE — SIGNIFICANT CHANGE UP
COLOR SPEC: YELLOW — SIGNIFICANT CHANGE UP
DIFF PNL FLD: NEGATIVE — SIGNIFICANT CHANGE UP
EPI CELLS # UR: SIGNIFICANT CHANGE UP
GLUCOSE UR QL: NEGATIVE MG/DL — SIGNIFICANT CHANGE UP
KETONES UR-MCNC: ABNORMAL
LEUKOCYTE ESTERASE UR-ACNC: ABNORMAL
NITRITE UR-MCNC: NEGATIVE — SIGNIFICANT CHANGE UP
PH UR: 7 — SIGNIFICANT CHANGE UP (ref 5–8)
PROT UR-MCNC: 30 MG/DL
RBC CASTS # UR COMP ASSIST: SIGNIFICANT CHANGE UP /HPF (ref 0–4)
SP GR SPEC: 1.02 — SIGNIFICANT CHANGE UP (ref 1.01–1.02)
UROBILINOGEN FLD QL: 1 MG/DL
WBC UR QL: SIGNIFICANT CHANGE UP

## 2019-11-08 PROCEDURE — 81001 URINALYSIS AUTO W/SCOPE: CPT

## 2019-11-08 PROCEDURE — 59050 FETAL MONITOR W/REPORT: CPT

## 2019-11-08 PROCEDURE — G0463: CPT

## 2019-11-08 PROCEDURE — 59025 FETAL NON-STRESS TEST: CPT

## 2019-11-08 NOTE — OB PROVIDER TRIAGE NOTE - HISTORY OF PRESENT ILLNESS
30yo  at 33w presenting with pinked tinged vaginal discharge, with known history of yeast infection in the office on Tuesday. Patient was evaluated on Tuesday by OB, all vaginal cultures and urine studies sent. She was sent home with suppositories but stopped use after noting bright red blood after placement. No history of placenta previa. No recent intercourse or vaginal examinations. Patient asymptomatic. Denies uterine contraction, LOF. Active movement.     OBHX: NSVDx3, MAB x1   Gynhx: D&C x1   All:NKDA  Meds: PNV, Supossitories

## 2019-11-08 NOTE — OB PROVIDER TRIAGE NOTE - NSHPPHYSICALEXAM_GEN_ALL_CORE
Vital Signs Last 24 Hrs  T(C): 36.6 (08 Nov 2019 20:06), Max: 37.1 (08 Nov 2019 20:01)  T(F): 97.9 (08 Nov 2019 20:06), Max: 98.78 (08 Nov 2019 20:01)  HR: 86 (08 Nov 2019 21:08) (86 - 88)  BP: 101/62 (08 Nov 2019 21:08) (100/59 - 101/62)  RR: 16 (08 Nov 2019 20:06) (16 - 16)    General: NAD   Heart: RRR  Lungs: CTAB  SSPE: friable anterior lip of cervix, thick white discharge pooling in vault, no uterine bleeding,   SVE: FT, thick, posterior    Bedside sono: vertex, anterior placenta, greater than 2/2 pocket, BPP 8/8

## 2019-11-08 NOTE — OB PROVIDER TRIAGE NOTE - NSOBPROVIDERNOTE_OBGYN_ALL_OB_FT
28yo  at 33w with known cervicitis/vaginitis with friable cervix likely causing small bright red bleeding with suppository use.     - UA neg  - Outpatient cultures consistent with Candida infection  - Continue with outpatient treatment  - No previable, no trauma  -  precautions given   - She should expect spotting from speculum and cervical check done today  - safe for discharge home

## 2019-11-08 NOTE — OB PROVIDER TRIAGE NOTE - ADDITIONAL INSTRUCTIONS
Followup with OB for next appointment.   Increase oral hydration   SHould expect spotting from friable cervix. Pelvic rest  Continue with suppository treatment (Terazol)  All Precautions given

## 2019-11-08 NOTE — OB PROVIDER TRIAGE NOTE - NSHPLABSRESULTS_GEN_ALL_CORE
Urinalysis Basic - ( 2019 21:15 )    Color: Yellow / Appearance: Clear / S.020 / pH: x  Gluc: x / Ketone: Trace  / Bili: Negative / Urobili: 1 mg/dL   Blood: x / Protein: 30 mg/dL / Nitrite: Negative   Leuk Esterase: Trace / RBC: 0-2 /HPF / WBC 0-2   Sq Epi: x / Non Sq Epi: Occasional / Bacteria: x

## 2019-11-08 NOTE — OB RN TRIAGE NOTE - NSNURSINGINSTR_OBGYN_ALL_OB_FT
Discharge order noted. Patient is to continue treatment given by Dr. Lebron and follow up with MD as scheduled. Patient is advised to increase oral hydration. Discharge order noted. UA neg, Ordered to increase oral hydration, continue with suppository treatment given by Dr. Lebron (Terazol), Follow up WIth Dr. Lebron in office as scheduled. Patient advised she might see spotting from speculum and cervical check done today,  precautions given. Patient and spouse verbalized understanding. Patient discharged in stable condition. EFM tracing reviewed by Dr. Cruz, Category 1 tracing noted, no contractions.

## 2019-11-11 DIAGNOSIS — Z3A.32 32 WEEKS GESTATION OF PREGNANCY: ICD-10-CM

## 2019-11-21 ENCOUNTER — NON-APPOINTMENT (OUTPATIENT)
Age: 29
End: 2019-11-21

## 2019-11-21 ENCOUNTER — APPOINTMENT (OUTPATIENT)
Dept: OBGYN | Facility: CLINIC | Age: 29
End: 2019-11-21
Payer: MEDICAID

## 2019-11-21 VITALS
HEIGHT: 61 IN | BODY MASS INDEX: 25.9 KG/M2 | HEART RATE: 92 BPM | WEIGHT: 137.19 LBS | DIASTOLIC BLOOD PRESSURE: 69 MMHG | SYSTOLIC BLOOD PRESSURE: 111 MMHG

## 2019-11-21 PROCEDURE — 99213 OFFICE O/P EST LOW 20 MIN: CPT | Mod: TH

## 2019-11-22 LAB
BACTERIA UR CULT: NORMAL
C TRACH RRNA SPEC QL NAA+PROBE: NOT DETECTED
N GONORRHOEA RRNA SPEC QL NAA+PROBE: NOT DETECTED
SOURCE AMPLIFICATION: NORMAL

## 2019-11-26 ENCOUNTER — RX RENEWAL (OUTPATIENT)
Age: 29
End: 2019-11-26

## 2019-11-27 PROBLEM — Z3A.34 34 WEEKS GESTATION OF PREGNANCY: Status: RESOLVED | Noted: 2019-11-21 | Resolved: 2019-11-27

## 2019-12-03 ENCOUNTER — APPOINTMENT (OUTPATIENT)
Dept: OBGYN | Facility: CLINIC | Age: 29
End: 2019-12-03
Payer: MEDICAID

## 2019-12-03 ENCOUNTER — NON-APPOINTMENT (OUTPATIENT)
Age: 29
End: 2019-12-03

## 2019-12-03 VITALS
HEART RATE: 90 BPM | SYSTOLIC BLOOD PRESSURE: 95 MMHG | BODY MASS INDEX: 26.28 KG/M2 | WEIGHT: 139.06 LBS | DIASTOLIC BLOOD PRESSURE: 61 MMHG

## 2019-12-03 DIAGNOSIS — Z3A.34 34 WEEKS GESTATION OF PREGNANCY: ICD-10-CM

## 2019-12-03 PROCEDURE — 99213 OFFICE O/P EST LOW 20 MIN: CPT | Mod: TH

## 2019-12-04 LAB — HIV1+2 AB SPEC QL IA.RAPID: NONREACTIVE

## 2019-12-05 LAB
GP B STREP DNA SPEC QL NAA+PROBE: NORMAL
GP B STREP DNA SPEC QL NAA+PROBE: NOT DETECTED
SOURCE GBS: NORMAL

## 2019-12-12 ENCOUNTER — APPOINTMENT (OUTPATIENT)
Dept: OBGYN | Facility: CLINIC | Age: 29
End: 2019-12-12
Payer: MEDICAID

## 2019-12-12 ENCOUNTER — NON-APPOINTMENT (OUTPATIENT)
Age: 29
End: 2019-12-12

## 2019-12-12 VITALS
SYSTOLIC BLOOD PRESSURE: 94 MMHG | BODY MASS INDEX: 26.65 KG/M2 | WEIGHT: 141.06 LBS | DIASTOLIC BLOOD PRESSURE: 57 MMHG | HEART RATE: 91 BPM

## 2019-12-12 DIAGNOSIS — Z3A.37 37 WEEKS GESTATION OF PREGNANCY: ICD-10-CM

## 2019-12-12 DIAGNOSIS — Z3A.36 36 WEEKS GESTATION OF PREGNANCY: ICD-10-CM

## 2019-12-12 PROCEDURE — 99213 OFFICE O/P EST LOW 20 MIN: CPT | Mod: TH

## 2019-12-19 ENCOUNTER — APPOINTMENT (OUTPATIENT)
Dept: OBGYN | Facility: CLINIC | Age: 29
End: 2019-12-19
Payer: MEDICAID

## 2019-12-19 ENCOUNTER — NON-APPOINTMENT (OUTPATIENT)
Age: 29
End: 2019-12-19

## 2019-12-19 VITALS
BODY MASS INDEX: 27 KG/M2 | SYSTOLIC BLOOD PRESSURE: 96 MMHG | WEIGHT: 143 LBS | HEART RATE: 96 BPM | DIASTOLIC BLOOD PRESSURE: 64 MMHG | HEIGHT: 61 IN

## 2019-12-19 PROCEDURE — 99213 OFFICE O/P EST LOW 20 MIN: CPT | Mod: TH

## 2019-12-27 ENCOUNTER — APPOINTMENT (OUTPATIENT)
Dept: OBGYN | Facility: CLINIC | Age: 29
End: 2019-12-27
Payer: MEDICAID

## 2019-12-27 ENCOUNTER — NON-APPOINTMENT (OUTPATIENT)
Age: 29
End: 2019-12-27

## 2019-12-27 VITALS — BODY MASS INDEX: 27.43 KG/M2 | SYSTOLIC BLOOD PRESSURE: 90 MMHG | DIASTOLIC BLOOD PRESSURE: 60 MMHG | WEIGHT: 145.19 LBS

## 2019-12-27 PROCEDURE — 99213 OFFICE O/P EST LOW 20 MIN: CPT | Mod: TH

## 2019-12-28 ENCOUNTER — INPATIENT (INPATIENT)
Facility: HOSPITAL | Age: 29
LOS: 1 days | Discharge: HOME | End: 2019-12-30
Attending: OBSTETRICS & GYNECOLOGY | Admitting: OBSTETRICS & GYNECOLOGY
Payer: COMMERCIAL

## 2019-12-28 ENCOUNTER — TRANSCRIPTION ENCOUNTER (OUTPATIENT)
Age: 29
End: 2019-12-28

## 2019-12-28 VITALS — SYSTOLIC BLOOD PRESSURE: 106 MMHG | DIASTOLIC BLOOD PRESSURE: 65 MMHG | TEMPERATURE: 98 F | HEART RATE: 82 BPM

## 2019-12-28 DIAGNOSIS — O47.1 FALSE LABOR AT OR AFTER 37 COMPLETED WEEKS OF GESTATION: ICD-10-CM

## 2019-12-28 DIAGNOSIS — Z98.890 OTHER SPECIFIED POSTPROCEDURAL STATES: Chronic | ICD-10-CM

## 2019-12-28 LAB
APPEARANCE UR: CLEAR — SIGNIFICANT CHANGE UP
BACTERIA # UR AUTO: NEGATIVE — SIGNIFICANT CHANGE UP
BASOPHILS # BLD AUTO: 0.07 K/UL — SIGNIFICANT CHANGE UP (ref 0–0.2)
BASOPHILS NFR BLD AUTO: 0.8 % — SIGNIFICANT CHANGE UP (ref 0–2)
BILIRUB UR-MCNC: NEGATIVE — SIGNIFICANT CHANGE UP
BLD GP AB SCN SERPL QL: SIGNIFICANT CHANGE UP
COLOR SPEC: YELLOW — SIGNIFICANT CHANGE UP
DIFF PNL FLD: ABNORMAL
EOSINOPHIL # BLD AUTO: 0.48 K/UL — SIGNIFICANT CHANGE UP (ref 0–0.5)
EOSINOPHIL NFR BLD AUTO: 5.2 % — SIGNIFICANT CHANGE UP (ref 0–6)
EPI CELLS # UR: NEGATIVE — SIGNIFICANT CHANGE UP
GLUCOSE UR QL: NEGATIVE MG/DL — SIGNIFICANT CHANGE UP
HCT VFR BLD CALC: 38.6 % — SIGNIFICANT CHANGE UP (ref 34.5–45)
HGB BLD-MCNC: 12.5 G/DL — SIGNIFICANT CHANGE UP (ref 11.5–15.5)
IMM GRANULOCYTES NFR BLD AUTO: 2.7 % — HIGH (ref 0–1.5)
KETONES UR-MCNC: NEGATIVE — SIGNIFICANT CHANGE UP
LEUKOCYTE ESTERASE UR-ACNC: ABNORMAL
LYMPHOCYTES # BLD AUTO: 2.63 K/UL — SIGNIFICANT CHANGE UP (ref 1–3.3)
LYMPHOCYTES # BLD AUTO: 28.7 % — SIGNIFICANT CHANGE UP (ref 13–44)
MCHC RBC-ENTMCNC: 29.6 PG — SIGNIFICANT CHANGE UP (ref 27–34)
MCHC RBC-ENTMCNC: 32.4 GM/DL — SIGNIFICANT CHANGE UP (ref 32–36)
MCV RBC AUTO: 91.5 FL — SIGNIFICANT CHANGE UP (ref 80–100)
MONOCYTES # BLD AUTO: 0.77 K/UL — SIGNIFICANT CHANGE UP (ref 0–0.9)
MONOCYTES NFR BLD AUTO: 8.4 % — SIGNIFICANT CHANGE UP (ref 2–14)
NEUTROPHILS # BLD AUTO: 4.97 K/UL — SIGNIFICANT CHANGE UP (ref 1.8–7.4)
NEUTROPHILS NFR BLD AUTO: 54.2 % — SIGNIFICANT CHANGE UP (ref 43–77)
NITRITE UR-MCNC: NEGATIVE — SIGNIFICANT CHANGE UP
PH UR: 7 — SIGNIFICANT CHANGE UP (ref 5–8)
PLATELET # BLD AUTO: 218 K/UL — SIGNIFICANT CHANGE UP (ref 150–400)
PROT UR-MCNC: NEGATIVE MG/DL — SIGNIFICANT CHANGE UP
RBC # BLD: 4.22 M/UL — SIGNIFICANT CHANGE UP (ref 3.8–5.2)
RBC # FLD: 17.2 % — HIGH (ref 10.3–14.5)
RBC CASTS # UR COMP ASSIST: ABNORMAL /HPF (ref 0–4)
SP GR SPEC: 1.01 — SIGNIFICANT CHANGE UP (ref 1.01–1.02)
UROBILINOGEN FLD QL: NEGATIVE MG/DL — SIGNIFICANT CHANGE UP
WBC # BLD: 9.17 K/UL — SIGNIFICANT CHANGE UP (ref 3.8–10.5)
WBC # FLD AUTO: 9.17 K/UL — SIGNIFICANT CHANGE UP (ref 3.8–10.5)
WBC UR QL: SIGNIFICANT CHANGE UP

## 2019-12-28 PROCEDURE — 59409 OBSTETRICAL CARE: CPT | Mod: U9

## 2019-12-28 RX ORDER — OXYTOCIN 10 UNIT/ML
333.33 VIAL (ML) INJECTION
Qty: 20 | Refills: 0 | Status: DISCONTINUED | OUTPATIENT
Start: 2019-12-28 | End: 2019-12-30

## 2019-12-28 RX ORDER — ACETAMINOPHEN 500 MG
975 TABLET ORAL
Refills: 0 | Status: DISCONTINUED | OUTPATIENT
Start: 2019-12-28 | End: 2019-12-30

## 2019-12-28 RX ORDER — CITRIC ACID/SODIUM CITRATE 300-500 MG
30 SOLUTION, ORAL ORAL ONCE
Refills: 0 | Status: COMPLETED | OUTPATIENT
Start: 2019-12-28 | End: 2019-12-28

## 2019-12-28 RX ORDER — SODIUM CHLORIDE 9 MG/ML
1000 INJECTION, SOLUTION INTRAVENOUS
Refills: 0 | Status: DISCONTINUED | OUTPATIENT
Start: 2019-12-28 | End: 2019-12-28

## 2019-12-28 RX ORDER — MAGNESIUM HYDROXIDE 400 MG/1
30 TABLET, CHEWABLE ORAL
Refills: 0 | Status: DISCONTINUED | OUTPATIENT
Start: 2019-12-28 | End: 2019-12-30

## 2019-12-28 RX ORDER — OXYTOCIN 10 UNIT/ML
4 VIAL (ML) INJECTION
Qty: 30 | Refills: 0 | Status: DISCONTINUED | OUTPATIENT
Start: 2019-12-28 | End: 2019-12-30

## 2019-12-28 RX ORDER — BENZOCAINE 10 %
1 GEL (GRAM) MUCOUS MEMBRANE EVERY 6 HOURS
Refills: 0 | Status: DISCONTINUED | OUTPATIENT
Start: 2019-12-28 | End: 2019-12-30

## 2019-12-28 RX ORDER — SODIUM CHLORIDE 9 MG/ML
3 INJECTION INTRAMUSCULAR; INTRAVENOUS; SUBCUTANEOUS EVERY 8 HOURS
Refills: 0 | Status: DISCONTINUED | OUTPATIENT
Start: 2019-12-28 | End: 2019-12-30

## 2019-12-28 RX ORDER — SIMETHICONE 80 MG/1
80 TABLET, CHEWABLE ORAL EVERY 4 HOURS
Refills: 0 | Status: DISCONTINUED | OUTPATIENT
Start: 2019-12-28 | End: 2019-12-30

## 2019-12-28 RX ORDER — HYDROCORTISONE 1 %
1 OINTMENT (GRAM) TOPICAL EVERY 6 HOURS
Refills: 0 | Status: DISCONTINUED | OUTPATIENT
Start: 2019-12-28 | End: 2019-12-30

## 2019-12-28 RX ORDER — DIBUCAINE 1 %
1 OINTMENT (GRAM) RECTAL EVERY 6 HOURS
Refills: 0 | Status: DISCONTINUED | OUTPATIENT
Start: 2019-12-28 | End: 2019-12-30

## 2019-12-28 RX ORDER — TETANUS TOXOID, REDUCED DIPHTHERIA TOXOID AND ACELLULAR PERTUSSIS VACCINE, ADSORBED 5; 2.5; 8; 8; 2.5 [IU]/.5ML; [IU]/.5ML; UG/.5ML; UG/.5ML; UG/.5ML
0.5 SUSPENSION INTRAMUSCULAR ONCE
Refills: 0 | Status: DISCONTINUED | OUTPATIENT
Start: 2019-12-28 | End: 2019-12-30

## 2019-12-28 RX ORDER — IBUPROFEN 200 MG
600 TABLET ORAL EVERY 6 HOURS
Refills: 0 | Status: COMPLETED | OUTPATIENT
Start: 2019-12-28 | End: 2020-11-25

## 2019-12-28 RX ORDER — AER TRAVELER 0.5 G/1
1 SOLUTION RECTAL; TOPICAL EVERY 4 HOURS
Refills: 0 | Status: DISCONTINUED | OUTPATIENT
Start: 2019-12-28 | End: 2019-12-30

## 2019-12-28 RX ORDER — GLYCERIN ADULT
1 SUPPOSITORY, RECTAL RECTAL AT BEDTIME
Refills: 0 | Status: DISCONTINUED | OUTPATIENT
Start: 2019-12-28 | End: 2019-12-30

## 2019-12-28 RX ORDER — OXYCODONE HYDROCHLORIDE 5 MG/1
5 TABLET ORAL
Refills: 0 | Status: DISCONTINUED | OUTPATIENT
Start: 2019-12-28 | End: 2019-12-30

## 2019-12-28 RX ORDER — IBUPROFEN 200 MG
600 TABLET ORAL EVERY 6 HOURS
Refills: 0 | Status: DISCONTINUED | OUTPATIENT
Start: 2019-12-28 | End: 2019-12-30

## 2019-12-28 RX ORDER — SODIUM CHLORIDE 9 MG/ML
1000 INJECTION, SOLUTION INTRAVENOUS ONCE
Refills: 0 | Status: COMPLETED | OUTPATIENT
Start: 2019-12-28 | End: 2019-12-28

## 2019-12-28 RX ORDER — PRAMOXINE HYDROCHLORIDE 150 MG/15G
1 AEROSOL, FOAM RECTAL EVERY 4 HOURS
Refills: 0 | Status: DISCONTINUED | OUTPATIENT
Start: 2019-12-28 | End: 2019-12-30

## 2019-12-28 RX ORDER — DIPHENHYDRAMINE HCL 50 MG
25 CAPSULE ORAL EVERY 6 HOURS
Refills: 0 | Status: DISCONTINUED | OUTPATIENT
Start: 2019-12-28 | End: 2019-12-30

## 2019-12-28 RX ORDER — LANOLIN
1 OINTMENT (GRAM) TOPICAL EVERY 6 HOURS
Refills: 0 | Status: DISCONTINUED | OUTPATIENT
Start: 2019-12-28 | End: 2019-12-30

## 2019-12-28 RX ORDER — KETOROLAC TROMETHAMINE 30 MG/ML
30 SYRINGE (ML) INJECTION ONCE
Refills: 0 | Status: DISCONTINUED | OUTPATIENT
Start: 2019-12-28 | End: 2019-12-28

## 2019-12-28 RX ORDER — OXYCODONE HYDROCHLORIDE 5 MG/1
5 TABLET ORAL ONCE
Refills: 0 | Status: DISCONTINUED | OUTPATIENT
Start: 2019-12-28 | End: 2019-12-30

## 2019-12-28 RX ADMIN — SODIUM CHLORIDE 2000 MILLILITER(S): 9 INJECTION, SOLUTION INTRAVENOUS at 12:30

## 2019-12-28 RX ADMIN — Medication 30 MILLILITER(S): at 12:53

## 2019-12-28 RX ADMIN — Medication 30 MILLIGRAM(S): at 15:30

## 2019-12-28 RX ADMIN — Medication 975 MILLIGRAM(S): at 21:34

## 2019-12-28 RX ADMIN — Medication 4 MILLIUNIT(S)/MIN: at 08:10

## 2019-12-28 RX ADMIN — SODIUM CHLORIDE 3 MILLILITER(S): 9 INJECTION INTRAMUSCULAR; INTRAVENOUS; SUBCUTANEOUS at 22:24

## 2019-12-28 RX ADMIN — Medication 30 MILLIGRAM(S): at 15:31

## 2019-12-28 RX ADMIN — Medication 975 MILLIGRAM(S): at 22:23

## 2019-12-28 NOTE — OB PROVIDER H&P - ASSESSMENT
Patient is a 30yo  at 39w4d weeks gestation c/w LMP who presents to L&D for elective IOL.    Admit to L&D  Consent received  Admission labs ordered  IOL with pitocin started.

## 2019-12-28 NOTE — OB NEONATOLOGY/PEDIATRICIAN DELIVERY SUMMARY - NSPEDSNEONOTESA_OBGYN_ALL_OB_FT
Called to [facility]DR # 1 by Gopal Lebron MD to attend  of this term 39 5/7 weeks infant with thick meconium stained amniotic fluid.  Mother had good prenatal care. Mother is 31 year old  blood type B positive, serology NR, HBsAg negative, GBS negative, HIV negative, Rubella immune.  EDC 2019.  No known allergies, denies hypertension, denies diabetes, denies Asthma.    Social History:  , denies smoking, denies alcohol abuse, denies illicit drug use.  Family History:  unremarkable.  ROS: unobtainable in .  Labor and delivery:  AROM 2019 @ 1138 hours with thick meconium stained amniotic fluid.  Infant delivered 2019 @ 1447 hours.  Placed initially on mother's abdomen for skin to skin with good cry, tone and breathing, then at 10 minutes placed on radiant warmer bed, dried, repositioned and suction.  Apgar score 9 and 9 at 1 and 5 minutes respectively.  Infant transferred to regular nursery after continued skin to skin and bonding with parents.  Male.  Bwt:

## 2019-12-28 NOTE — CHART NOTE - NSCHARTNOTEFT_GEN_A_CORE
Pt seen and examined at bedside.  Currently comfortable  SVE: 3/60/-2  Aminotomy performed, heavy meconium  [place]: q2 min contractions  continue current management

## 2019-12-28 NOTE — DISCHARGE NOTE OB - AVOID SEXUAL ACTIVITY UNTIL YOUR POSTPARTUM VISIT
Treatment Number: 2
Anesthesia Type: 1% lidocaine with epinephrine
Detail Level: Zone
Pre-Procedure Text: After consent was obtained the treatment areas were cleaned and treated using the parameters mentioned above.
Post-Procedure Text: After the procedure post care was reviewed with the patient.
Post-Care Instructions: I reviewed with the patient in detail post-care instructions. Patient should stay away from the sun and wear sun protection until treated areas are fully healed.
Consent: Written consent obtained, risks reviewed including but not limited to crusting, scabbing, blistering, scarring, darker or lighter pigmentary change, bruising, and/or incomplete response.
External Cooling Fan Speed: 5
Number Of Passes (Optional): 190
Fluence (J/Cm2): 14
Price (Use Numbers Only, No Special Characters Or $): 479.19
Statement Selected

## 2019-12-28 NOTE — DISCHARGE NOTE OB - PATIENT PORTAL LINK FT
You can access the FollowMyHealth Patient Portal offered by [facility] by registering at the following website: [email or web address]. By joining [email or web address] FollowMyHealth portal, you will also be able to view your health information using other applications (apps) compatible with our system.

## 2019-12-28 NOTE — DISCHARGE NOTE OB - CARE PLAN
Principal Discharge DX:	 (normal spontaneous vaginal delivery)  Goal:	rapid recovery  Assessment and plan of treatment:	Patient should transition to regular activity level and resume regular diet. Patient should follow up with her OB for a post-partum checkup 6 weeks after discharge from the hospital. Patient should call her doctor sooner if she develops a fever or uncontrolled vaginal bleeding.

## 2019-12-28 NOTE — DISCHARGE NOTE OB - PLAN OF CARE
rapid recovery Patient should transition to regular activity level and resume regular diet. Patient should follow up with her OB for a post-partum checkup 6 weeks after discharge from the hospital. Patient should call her doctor sooner if she develops a fever or uncontrolled vaginal bleeding.

## 2019-12-28 NOTE — OB RN DELIVERY SUMMARY - NS_SEPSISRSKCALC_OBGYN_ALL_OB_FT
EOS calculated successfully. EOS Risk Factor: 0.5/1000 live births ([facility] national incidence); GA=39w4d; Temp=97.9; ROM=3.15; GBS='Negative'; Antibiotics='No antibiotics or any antibiotics < 2 hrs prior to birth'

## 2019-12-28 NOTE — OB PROVIDER H&P - HISTORY OF PRESENT ILLNESS
Patient is a 30yo  at 39w4d weeks gestation c/w LMP who presents to L&D for elective IOL.   MARGOT: 2019  LMP: 2019  Prenatal course uncomplicated    OBHx:  G1: 7948-61c-3sf-female- G2: 2052-88o-0kh-female- G3: 8383-72u-5ij-female-  (required post partum transfusion) PMH: none PSH: D&C,  Meds: pnv ALL: nkda BMI:  22.3	GBS: neg HIV: NR RPR:  Rubella: I HepB:  ABO: B+ Patient is a 30yo  at 39w4d weeks gestation c/w LMP who presents to L&D for elective IOL.   MARGOT: 2019  LMP: 2019  Prenatal course uncomplicated    OBHx:  G1: 9579-81d-6dm-female- G2: 5694-23y-5lo-female- G3: 9394-29l-5ch-female-  (required post partum transfusion) PMH: none PSH: D&C,  Meds: pnv ALL: nkda BMI:  22.3	GBS: neg HIV: NR RPR: refer to chart Rubella: I HepB: refer to chart ABO: B+

## 2019-12-28 NOTE — DISCHARGE NOTE OB - MEDICATION SUMMARY - MEDICATIONS TO TAKE
I will START or STAY ON the medications listed below when I get home from the hospital:    prenatal vitamins  -- 1 tab(s) by mouth once a day  -- Indication: For vitamins    ibuprofen 600 mg oral tablet  -- 1 tab(s) by mouth every 6 hours   -- Do not take this drug if you are pregnant.  It is very important that you take or use this exactly as directed.  Do not skip doses or discontinue unless directed by your doctor.  May cause drowsiness or dizziness.  Obtain medical advice before taking any non-prescription drugs as some may affect the action of this medication.  Take with food or milk.    -- Indication: For pain    benzocaine 20% topical spray  -- 1 spray(s) on skin every 6 hours, As needed, for Perineal discomfort  -- Indication: For vaginal pain    docusate sodium 100 mg oral tablet  -- 1 tab(s) by mouth once a day  -- Medication should be taken with plenty of water.    -- Indication: For constipation    simethicone 80 mg oral tablet, chewable  -- 1 tab(s) by mouth every 4 hours, As needed, Gas  -- Indication: For gas pain

## 2019-12-28 NOTE — OB PROVIDER H&P - NSHPPHYSICALEXAM_GEN_ALL_CORE
Vital Signs Last 24 Hrs  T(C): 36.6 (28 Dec 2019 06:42), Max: 36.6 (28 Dec 2019 06:35)  T(F): 97.9 (28 Dec 2019 06:42), Max: 97.9 (28 Dec 2019 06:42)  HR: 86 (28 Dec 2019 07:17) (82 - 86)  BP: 87/51 (28 Dec 2019 07:17) (87/51 - 106/65)      General: NAD, awake alert  CVR: Regular rate and rhythm, Normal s1, s2 , no murmurs, rubs or gallops.  LUNG: Clear to auscultation bilaterally. No wheezing, crackles, ronchi.  ABDOMEN: Gravid, nontender    CE: 2/60/-1  [place]: q15-20 mins  FHR: 135, cat 1 Vital Signs Last 24 Hrs  T(C): 36.6 (28 Dec 2019 06:42), Max: 36.6 (28 Dec 2019 06:35)  T(F): 97.9 (28 Dec 2019 06:42), Max: 97.9 (28 Dec 2019 06:42)  HR: 86 (28 Dec 2019 07:17) (82 - 86)  BP: 87/51 (28 Dec 2019 07:17) (87/51 - 106/65)      General: NAD, awake alert  CVR: Regular rate and rhythm, Normal s1, s2 , no murmurs, rubs or gallops.  LUNG: Clear to auscultation bilaterally. No wheezing, crackles, ronchi.  ABDOMEN: Gravid, nontender    CE: 2/60/-1  Sono: vertex  [place]: q15-20 mins  FHR: 135, cat 1

## 2019-12-28 NOTE — OB RN PATIENT PROFILE - HEART RATE (BEATS/MIN)
Takes atorvastatin 10mg every 3 days. ASCVD risk of 7.8%, mod-intensity recommended  · Continue current therapy   · Advised to take every day  · Continue ASA   82

## 2019-12-28 NOTE — OB PROVIDER IHI INDUCTION/AUGMENTATION NOTE - NS_CHECKALL_OBGYN_ALL_OB
Order was written/Contractions pattern was reviewed/H&P was completed/FHR was reviewed FHR was reviewed/Contractions pattern was reviewed/Order was written/H&P was completed/Induction / Augmentation was discussed

## 2019-12-28 NOTE — OB PROVIDER DELIVERY SUMMARY - NSPROVIDERDELIVERYNOTE_OBGYN_ALL_OB_FT
at 14:47 PM of a live male, weight deferred to skin to skin, and Apgars 9/9. Delivered OP, nuchal cord x 1 reducible, clear fluid. Infant's head delivered with maternal expulsive efforts. Shoulders delivered without difficulty followed by the rest of the body. Nose and mouth were bulb suctioned. Cord clamped and cut after delay. Samples obtained. Baby handed to patient. Placenta delivered spontaneously, intact, 3VC. Fundus firm, minimal bleeding. Perineum and vagina inspected – small 1st degree perineal laceration repaired. EBL 250cc. Hemostasis noted. Pt tolerated procedure well, in stable condition, recovering in LDR. Infant in LDR. Instrument/sponge count correct x 2 and confirmed by nurse.

## 2019-12-28 NOTE — DISCHARGE NOTE OB - CARE PROVIDER_API CALL
Gopal Lebron)  Obstetrics and Gynecology  [address], [address]  [place] [address]  Phone: [phone]  Fax: [phone]  Follow Up Time: 1 month

## 2019-12-29 LAB
HCT VFR BLD CALC: 35 % — SIGNIFICANT CHANGE UP (ref 34.5–45)
HGB BLD-MCNC: 11.5 G/DL — SIGNIFICANT CHANGE UP (ref 11.5–15.5)
T PALLIDUM AB TITR SER: NEGATIVE — SIGNIFICANT CHANGE UP

## 2019-12-29 RX ORDER — SIMETHICONE 80 MG/1
1 TABLET, CHEWABLE ORAL
Qty: 0 | Refills: 0 | DISCHARGE
Start: 2019-12-29

## 2019-12-29 RX ORDER — IBUPROFEN 200 MG
1 TABLET ORAL
Qty: 28 | Refills: 0
Start: 2019-12-29 | End: 2020-01-04

## 2019-12-29 RX ORDER — BENZOCAINE 10 %
1 GEL (GRAM) MUCOUS MEMBRANE
Qty: 0 | Refills: 0 | DISCHARGE
Start: 2019-12-29

## 2019-12-29 RX ADMIN — Medication 975 MILLIGRAM(S): at 15:49

## 2019-12-29 RX ADMIN — Medication 600 MILLIGRAM(S): at 18:07

## 2019-12-29 RX ADMIN — Medication 600 MILLIGRAM(S): at 18:45

## 2019-12-29 RX ADMIN — Medication 600 MILLIGRAM(S): at 12:51

## 2019-12-29 RX ADMIN — Medication 975 MILLIGRAM(S): at 16:30

## 2019-12-29 RX ADMIN — Medication 975 MILLIGRAM(S): at 09:52

## 2019-12-29 RX ADMIN — Medication 1 TABLET(S): at 12:51

## 2019-12-29 RX ADMIN — Medication 600 MILLIGRAM(S): at 00:07

## 2019-12-29 RX ADMIN — Medication 975 MILLIGRAM(S): at 10:50

## 2019-12-29 RX ADMIN — SODIUM CHLORIDE 3 MILLILITER(S): 9 INJECTION INTRAMUSCULAR; INTRAVENOUS; SUBCUTANEOUS at 05:54

## 2019-12-29 RX ADMIN — Medication 600 MILLIGRAM(S): at 01:05

## 2019-12-29 RX ADMIN — Medication 600 MILLIGRAM(S): at 13:30

## 2019-12-29 NOTE — PROGRESS NOTE ADULT - SUBJECTIVE AND OBJECTIVE BOX
Patient is a 30yo  now PPD#1 s/p spontaneous vaginal delivery    S:    No acute events overnight.  Pt seen and examined at bedside. Pt doing well.  Has no complaints.  Pain well controlled on current regiment.  Pt ambulating, tolerating PO, voiding w/o difficulty, +flatus/-BM.    O:    T(C): 37.2 (19 @ 19:32), Max: 37.2 (19 @ 19:32)  HR: 68 (19 @ 19:32) (54 - 155)  BP: 106/64 (19 @ 19:32) (72/41 - 154/86)  RR: 18 (19 @ 19:32) (16 - 18)  SpO2: 97% (19 @ 19:32) (84% - 100%)    Physical Exam  Breast: NT, non-engorged  Abdomen:  soft, NT, ND, BS+  Uterus:  firm below umbilicus  VE:  expectant lochia  Ext:  NT, nonedematous                          12.5   9.17  )-----------( 218      ( 28 Dec 2019 07:06 )             38.6

## 2019-12-30 VITALS
HEART RATE: 61 BPM | SYSTOLIC BLOOD PRESSURE: 106 MMHG | DIASTOLIC BLOOD PRESSURE: 72 MMHG | OXYGEN SATURATION: 96 % | RESPIRATION RATE: 18 BRPM | TEMPERATURE: 98 F

## 2019-12-30 PROCEDURE — 81001 URINALYSIS AUTO W/SCOPE: CPT

## 2019-12-30 PROCEDURE — 85018 HEMOGLOBIN: CPT

## 2019-12-30 PROCEDURE — 59050 FETAL MONITOR W/REPORT: CPT

## 2019-12-30 PROCEDURE — 85014 HEMATOCRIT: CPT

## 2019-12-30 PROCEDURE — 86901 BLOOD TYPING SEROLOGIC RH(D): CPT

## 2019-12-30 PROCEDURE — 36415 COLL VENOUS BLD VENIPUNCTURE: CPT

## 2019-12-30 PROCEDURE — 86900 BLOOD TYPING SEROLOGIC ABO: CPT

## 2019-12-30 PROCEDURE — 85027 COMPLETE CBC AUTOMATED: CPT

## 2019-12-30 PROCEDURE — 86780 TREPONEMA PALLIDUM: CPT

## 2019-12-30 PROCEDURE — 86850 RBC ANTIBODY SCREEN: CPT

## 2019-12-30 RX ADMIN — Medication 975 MILLIGRAM(S): at 08:40

## 2019-12-30 RX ADMIN — Medication 975 MILLIGRAM(S): at 09:40

## 2019-12-30 NOTE — PROGRESS NOTE ADULT - PROBLEM SELECTOR PLAN 1
- Stable  - Hgb 12.5 -> 11.5 postpartum  - Pain: well controlled on PO pain meds  - GI: Regular diet  - : voiding  - DVT prophylaxis: ambulate  - Dispo: PPD 2, unless otherwise specified

## 2020-02-06 ENCOUNTER — APPOINTMENT (OUTPATIENT)
Dept: OBGYN | Facility: CLINIC | Age: 30
End: 2020-02-06
Payer: MEDICAID

## 2020-02-06 PROCEDURE — 99214 OFFICE O/P EST MOD 30 MIN: CPT | Mod: TH

## 2020-02-06 NOTE — PHYSICAL EXAM
[Awake] : awake [Mass] : no breast mass [Alert] : alert [Acute Distress] : no acute distress [Nipple Discharge] : no nipple discharge [Tender] : non tender [Soft] : soft [Axillary LAD] : no axillary lymphadenopathy [Oriented x3] : oriented to person, place, and time [Normal] : uterus [No Bleeding] : there was no active vaginal bleeding [Uterine Adnexae] : were not tender and not enlarged

## 2020-02-13 LAB — CYTOLOGY CVX/VAG DOC THIN PREP: ABNORMAL

## 2020-03-06 ENCOUNTER — APPOINTMENT (OUTPATIENT)
Dept: OBGYN | Facility: CLINIC | Age: 30
End: 2020-03-06
Payer: MEDICAID

## 2020-03-06 VITALS
BODY MASS INDEX: 24.45 KG/M2 | DIASTOLIC BLOOD PRESSURE: 68 MMHG | WEIGHT: 129.38 LBS | SYSTOLIC BLOOD PRESSURE: 100 MMHG

## 2020-03-06 LAB
HCG UR QL: NEGATIVE
QUALITY CONTROL: YES

## 2020-03-06 PROCEDURE — 11981 INSERTION DRUG DLVR IMPLANT: CPT

## 2020-03-18 ENCOUNTER — APPOINTMENT (OUTPATIENT)
Dept: OBGYN | Facility: CLINIC | Age: 30
End: 2020-03-18

## 2020-04-16 ENCOUNTER — APPOINTMENT (OUTPATIENT)
Dept: OBGYN | Facility: CLINIC | Age: 30
End: 2020-04-16

## 2020-05-08 ENCOUNTER — APPOINTMENT (OUTPATIENT)
Dept: OBGYN | Facility: CLINIC | Age: 30
End: 2020-05-08
Payer: MEDICAID

## 2020-05-08 DIAGNOSIS — R87.619 UNSPECIFIED ABNORMAL CYTOLOGICAL FINDINGS IN SPECIMENS FROM CERVIX UTERI: ICD-10-CM

## 2020-05-08 LAB
HCG UR QL: NEGATIVE
QUALITY CONTROL: YES

## 2020-05-08 PROCEDURE — 57454 BX/CURETT OF CERVIX W/SCOPE: CPT

## 2020-05-08 NOTE — PROCEDURE
[Colposcopy] : colposcopy [Abnormal Pap] : abnormal pap smear [Patient] : patient [Risks] : risks [Benefits] : benefits [Alternatives] : alternatives [Infection] : infection [Bleeding] : bleeding [Allergic Reaction] : allergic reaction [Pap Performed] : a cervical Pap smear was not performed [Consent Obtained] : written consent was obtained prior to the procedure [SCJ Fully Visulized] : the squamocolumnar junction was fully visualized [Leukoplakia ___ o'clock] : no leukoplakia [Punctation ___ o'clock] : no punctation [Acetowhite ___ o'clock] : ascetowhite changes at [unfilled] ~Uo'clock [Mosaicism ___ o'clock] : no mosaicism [Atypical Vessels ___ o'clock] : no atypical vessels [No Abnormalities] : no abnormalities seen [Biopsy Locations ___ o'clock] : the biopsies were taken at [unfilled] o'clock [Tolerated Well] : the patient tolerated the procedure well [Direct Pressure] : direct pressure [No Complications] : there were no complications

## 2020-05-11 LAB — CORE LAB BIOPSY: NORMAL

## 2020-05-14 ENCOUNTER — APPOINTMENT (OUTPATIENT)
Dept: OBGYN | Facility: CLINIC | Age: 30
End: 2020-05-14
Payer: MEDICAID

## 2020-05-14 PROCEDURE — 99214 OFFICE O/P EST MOD 30 MIN: CPT

## 2020-05-14 NOTE — COUNSELING
[Nutrition] : nutrition [Breast Self Exam] : breast self exam [Exercise] : exercise [Vitamins/Supplements] : vitamins/supplements [Safe Sexual Practices] : safe sexual practices [STD (testing, results, tx)] : STD (testing, results, tx)

## 2020-05-18 LAB
BASOPHILS # BLD AUTO: 0.07 K/UL
BASOPHILS NFR BLD AUTO: 0.8 %
EOSINOPHIL # BLD AUTO: 0.58 K/UL
EOSINOPHIL NFR BLD AUTO: 6.4 %
HCT VFR BLD CALC: 43.3 %
HGB BLD-MCNC: 13.5 G/DL
IMM GRANULOCYTES NFR BLD AUTO: 0.1 %
LYMPHOCYTES # BLD AUTO: 2.87 K/UL
LYMPHOCYTES NFR BLD AUTO: 31.7 %
MAN DIFF?: NORMAL
MCHC RBC-ENTMCNC: 31 PG
MCHC RBC-ENTMCNC: 31.2 GM/DL
MCV RBC AUTO: 99.3 FL
MONOCYTES # BLD AUTO: 0.55 K/UL
MONOCYTES NFR BLD AUTO: 6.1 %
NEUTROPHILS # BLD AUTO: 4.98 K/UL
NEUTROPHILS NFR BLD AUTO: 54.9 %
PLATELET # BLD AUTO: 314 K/UL
RBC # BLD: 4.36 M/UL
RBC # FLD: 11.9 %
WBC # FLD AUTO: 9.06 K/UL

## 2020-05-28 ENCOUNTER — APPOINTMENT (OUTPATIENT)
Dept: OBGYN | Facility: CLINIC | Age: 30
End: 2020-05-28

## 2020-06-11 ENCOUNTER — APPOINTMENT (OUTPATIENT)
Dept: OBGYN | Facility: CLINIC | Age: 30
End: 2020-06-11
Payer: MEDICAID

## 2020-06-11 VITALS
SYSTOLIC BLOOD PRESSURE: 114 MMHG | HEIGHT: 61 IN | BODY MASS INDEX: 25.14 KG/M2 | WEIGHT: 133.19 LBS | DIASTOLIC BLOOD PRESSURE: 80 MMHG | HEART RATE: 71 BPM

## 2020-06-11 PROCEDURE — 99213 OFFICE O/P EST LOW 20 MIN: CPT

## 2020-09-04 DIAGNOSIS — Z3A.38 38 WEEKS GESTATION OF PREGNANCY: ICD-10-CM

## 2020-09-04 DIAGNOSIS — Z3A.39 39 WEEKS GESTATION OF PREGNANCY: ICD-10-CM

## 2020-09-25 ENCOUNTER — APPOINTMENT (OUTPATIENT)
Dept: OBGYN | Facility: CLINIC | Age: 30
End: 2020-09-25
Payer: MEDICAID

## 2020-09-25 VITALS
WEIGHT: 130.56 LBS | BODY MASS INDEX: 24.67 KG/M2 | DIASTOLIC BLOOD PRESSURE: 80 MMHG | SYSTOLIC BLOOD PRESSURE: 109 MMHG

## 2020-09-25 DIAGNOSIS — N92.1 EXCESSIVE AND FREQUENT MENSTRUATION WITH IRREGULAR CYCLE: ICD-10-CM

## 2020-09-25 PROCEDURE — 11982 REMOVE DRUG IMPLANT DEVICE: CPT

## 2020-10-02 LAB — CORE LAB BIOPSY: NORMAL

## 2020-12-21 PROBLEM — Z86.19 HISTORY OF CANDIDIASIS OF VAGINA: Status: RESOLVED | Noted: 2019-11-05 | Resolved: 2020-12-21

## 2021-10-19 NOTE — OB RN DELIVERY SUMMARY - NS_PLACENTA_OBGYN_ALL_OB_DT
no lesions,  no deformities,  no traumatic injuries,  no significant scars are present,  chest wall non-tender,  no masses present, breathing is unlabored without accessory muscle use,normal breath sounds 28-Dec-2019 14:51

## 2022-03-03 ENCOUNTER — RESULT CHARGE (OUTPATIENT)
Age: 32
End: 2022-03-03

## 2022-03-03 ENCOUNTER — APPOINTMENT (OUTPATIENT)
Dept: OBGYN | Facility: CLINIC | Age: 32
End: 2022-03-03
Payer: MEDICAID

## 2022-03-03 VITALS
BODY MASS INDEX: 24.17 KG/M2 | HEIGHT: 61 IN | WEIGHT: 128 LBS | SYSTOLIC BLOOD PRESSURE: 100 MMHG | DIASTOLIC BLOOD PRESSURE: 70 MMHG

## 2022-03-03 PROCEDURE — 99395 PREV VISIT EST AGE 18-39: CPT

## 2022-03-03 PROCEDURE — 99213 OFFICE O/P EST LOW 20 MIN: CPT | Mod: 25

## 2022-03-03 RX ORDER — PRENATAL VIT NO.130/IRON/FOLIC 27MG-0.8MG
28-0.8 TABLET ORAL DAILY
Qty: 90 | Refills: 3 | Status: ACTIVE | COMMUNITY
Start: 2022-03-03 | End: 1900-01-01

## 2022-03-03 RX ORDER — ONDANSETRON 4 MG/1
4 TABLET ORAL
Qty: 60 | Refills: 2 | Status: ACTIVE | COMMUNITY
Start: 2022-03-03 | End: 1900-01-01

## 2022-03-03 NOTE — COUNSELING
[Nutrition/ Exercise/ Weight Management] : nutrition, exercise, weight management [Body Image] : body image [Vitamins/Supplements] : vitamins/supplements [Sunscreen] : sunscreen [Smoking Cessation] : smoking cessation [Drugs/Alcohol] : drugs, alcohol [Bladder Hygiene] : bladder hygiene [Breast Self Exam] : breast self exam

## 2022-03-04 LAB
C TRACH RRNA SPEC QL NAA+PROBE: NOT DETECTED
HCG UR QL: NEGATIVE
HPV HIGH+LOW RISK DNA PNL CVX: NOT DETECTED
N GONORRHOEA RRNA SPEC QL NAA+PROBE: NOT DETECTED
QUALITY CONTROL: YES
SOURCE TP AMPLIFICATION: NORMAL

## 2022-03-08 ENCOUNTER — APPOINTMENT (OUTPATIENT)
Dept: ANTEPARTUM | Facility: CLINIC | Age: 32
End: 2022-03-08
Payer: MEDICAID

## 2022-03-08 ENCOUNTER — ASOB RESULT (OUTPATIENT)
Age: 32
End: 2022-03-08

## 2022-03-08 PROCEDURE — 76817 TRANSVAGINAL US OBSTETRIC: CPT

## 2022-03-11 LAB — CYTOLOGY CVX/VAG DOC THIN PREP: NORMAL

## 2022-03-17 ENCOUNTER — LABORATORY RESULT (OUTPATIENT)
Age: 32
End: 2022-03-17

## 2022-03-23 ENCOUNTER — APPOINTMENT (OUTPATIENT)
Dept: OBGYN | Facility: CLINIC | Age: 32
End: 2022-03-23
Payer: MEDICAID

## 2022-03-23 VITALS
HEIGHT: 61 IN | BODY MASS INDEX: 23.68 KG/M2 | WEIGHT: 125.4 LBS | DIASTOLIC BLOOD PRESSURE: 70 MMHG | SYSTOLIC BLOOD PRESSURE: 110 MMHG

## 2022-03-23 DIAGNOSIS — Z97.5 PRESENCE OF (INTRAUTERINE) CONTRACEPTIVE DEVICE: ICD-10-CM

## 2022-03-23 DIAGNOSIS — Z87.59 PERSONAL HISTORY OF OTHER COMPLICATIONS OF PREGNANCY, CHILDBIRTH AND THE PUERPERIUM: ICD-10-CM

## 2022-03-23 DIAGNOSIS — O26.893 OTHER SPECIFIED PREGNANCY RELATED CONDITIONS, THIRD TRIMESTER: ICD-10-CM

## 2022-03-23 DIAGNOSIS — Z92.89 PERSONAL HISTORY OF OTHER MEDICAL TREATMENT: ICD-10-CM

## 2022-03-23 DIAGNOSIS — Z30.017 ENCOUNTER FOR INITIAL PRESCRIPTION OF IMPLANTABLE SUBDERMAL CONTRACEPTIVE: ICD-10-CM

## 2022-03-23 DIAGNOSIS — R12 OTHER SPECIFIED PREGNANCY RELATED CONDITIONS, THIRD TRIMESTER: ICD-10-CM

## 2022-03-23 DIAGNOSIS — Z87.42 PERSONAL HISTORY OF OTHER DISEASES OF THE FEMALE GENITAL TRACT: ICD-10-CM

## 2022-03-23 DIAGNOSIS — N91.1 SECONDARY AMENORRHEA: ICD-10-CM

## 2022-03-23 DIAGNOSIS — N92.1 EXCESSIVE AND FREQUENT MENSTRUATION WITH IRREGULAR CYCLE: ICD-10-CM

## 2022-03-23 DIAGNOSIS — O09.891 SUPERVISION OF OTHER HIGH RISK PREGNANCIES, FIRST TRIMESTER: ICD-10-CM

## 2022-03-23 DIAGNOSIS — Z34.80 ENCOUNTER FOR SUPERVISION OF OTHER NORMAL PREGNANCY, UNSPECIFIED TRIMESTER: ICD-10-CM

## 2022-03-23 PROCEDURE — 99213 OFFICE O/P EST LOW 20 MIN: CPT

## 2022-03-23 RX ORDER — TERCONAZOLE 8 MG/G
0.8 CREAM VAGINAL
Qty: 1 | Refills: 2 | Status: COMPLETED | COMMUNITY
Start: 2019-11-05 | End: 2022-03-23

## 2022-03-23 RX ORDER — FAMOTIDINE 20 MG/1
20 TABLET, FILM COATED ORAL
Qty: 60 | Refills: 0 | Status: COMPLETED | COMMUNITY
Start: 2019-11-05 | End: 2022-03-23

## 2022-03-23 RX ORDER — FERROUS FUMARATE/ASCORBIC ACID 65MG-25 MG
65-25 TABLET, EXTENDED RELEASE ORAL
Qty: 180 | Refills: 2 | Status: COMPLETED | COMMUNITY
Start: 2019-06-12 | End: 2022-03-23

## 2022-03-23 RX ORDER — DESOGESTREL AND ETHINYL ESTRADIOL 0.15-0.03
0.15-3 KIT ORAL DAILY
Qty: 3 | Refills: 3 | Status: COMPLETED | COMMUNITY
Start: 2020-09-25 | End: 2022-03-23

## 2022-03-23 RX ORDER — CEPHALEXIN 500 MG/1
500 CAPSULE ORAL 4 TIMES DAILY
Qty: 28 | Refills: 2 | Status: COMPLETED | COMMUNITY
Start: 2019-07-08 | End: 2022-03-23

## 2022-03-23 RX ORDER — PRENATAL VIT NO.130/IRON/FOLIC 27MG-0.8MG
28-0.8 TABLET ORAL DAILY
Qty: 90 | Refills: 2 | Status: COMPLETED | COMMUNITY
Start: 2019-06-12 | End: 2022-03-23

## 2022-03-23 RX ORDER — MEDROXYPROGESTERONE ACETATE 10 MG/1
10 TABLET ORAL DAILY
Qty: 12 | Refills: 5 | Status: COMPLETED | COMMUNITY
Start: 2020-05-14 | End: 2022-03-23

## 2022-03-23 RX ORDER — FERROUS FUMARATE/ASCORBIC ACID 65MG-25 MG
65-25 TABLET, EXTENDED RELEASE ORAL
Qty: 1 | Refills: 3 | Status: COMPLETED | COMMUNITY
Start: 2019-09-09 | End: 2022-03-23

## 2022-03-23 RX ORDER — PRENATAL VIT NO.130/IRON/FOLIC 27MG-0.8MG
28-0.8 TABLET ORAL DAILY
Qty: 90 | Refills: 3 | Status: COMPLETED | COMMUNITY
Start: 2019-09-09 | End: 2022-03-23

## 2022-03-24 LAB
ABO + RH PNL BLD: NORMAL
ALBUMIN SERPL ELPH-MCNC: 4.1 G/DL
ALP BLD-CCNC: 50 U/L
ALT SERPL-CCNC: 12 U/L
ANION GAP SERPL CALC-SCNC: 17 MMOL/L
APPEARANCE: CLEAR
AST SERPL-CCNC: 19 U/L
BASOPHILS # BLD AUTO: 0.06 K/UL
BASOPHILS NFR BLD AUTO: 0.6 %
BILIRUB SERPL-MCNC: <0.2 MG/DL
BILIRUBIN URINE: NEGATIVE
BLD GP AB SCN SERPL QL: NORMAL
BLOOD URINE: NEGATIVE
BUN SERPL-MCNC: 10 MG/DL
CALCIUM SERPL-MCNC: 9.4 MG/DL
CHLORIDE SERPL-SCNC: 102 MMOL/L
CO2 SERPL-SCNC: 20 MMOL/L
COLOR: NORMAL
CREAT SERPL-MCNC: 0.53 MG/DL
EGFR: 125 ML/MIN/1.73M2
EOSINOPHIL # BLD AUTO: 1.15 K/UL
EOSINOPHIL NFR BLD AUTO: 12.3 %
ESTIMATED AVERAGE GLUCOSE: 108 MG/DL
GLUCOSE QUALITATIVE U: NEGATIVE
GLUCOSE SERPL-MCNC: 56 MG/DL
HBA1C MFR BLD HPLC: 5.4 %
HBV SURFACE AG SER QL: NONREACTIVE
HCT VFR BLD CALC: 32.7 %
HCV AB SER QL: NONREACTIVE
HCV S/CO RATIO: 0.09 S/CO
HGB BLD-MCNC: 9.8 G/DL
HIV1+2 AB SPEC QL IA.RAPID: NONREACTIVE
IMM GRANULOCYTES NFR BLD AUTO: 0.2 %
KETONES URINE: NEGATIVE
LEUKOCYTE ESTERASE URINE: NEGATIVE
LYMPHOCYTES # BLD AUTO: 2.58 K/UL
LYMPHOCYTES NFR BLD AUTO: 27.5 %
MAN DIFF?: NORMAL
MCHC RBC-ENTMCNC: 22.7 PG
MCHC RBC-ENTMCNC: 30 GM/DL
MCV RBC AUTO: 75.7 FL
MONOCYTES # BLD AUTO: 0.57 K/UL
MONOCYTES NFR BLD AUTO: 6.1 %
NEUTROPHILS # BLD AUTO: 5 K/UL
NEUTROPHILS NFR BLD AUTO: 53.3 %
NITRITE URINE: NEGATIVE
PH URINE: 6
PLATELET # BLD AUTO: 365 K/UL
POTASSIUM SERPL-SCNC: 4 MMOL/L
PROT SERPL-MCNC: 6.6 G/DL
PROTEIN URINE: NEGATIVE
RBC # BLD: 4.32 M/UL
RBC # FLD: 18.9 %
SODIUM SERPL-SCNC: 139 MMOL/L
SPECIFIC GRAVITY URINE: 1.02
TSH SERPL-ACNC: 0.07 UIU/ML
UROBILINOGEN URINE: NORMAL
WBC # FLD AUTO: 9.38 K/UL

## 2022-03-25 LAB
CMV IGG SERPL QL: 4.6 U/ML
CMV IGG SERPL-IMP: POSITIVE
CMV IGM SERPL QL: <8 AU/ML
CMV IGM SERPL QL: NEGATIVE
MEV IGG FLD QL IA: 59.2 AU/ML
MEV IGG+IGM SER-IMP: POSITIVE
MUV AB SER-ACNC: POSITIVE
MUV IGG SER QL IA: 16.5 AU/ML
RUBV IGG FLD-ACNC: 0.9 INDEX
RUBV IGG SER-IMP: NORMAL
T GONDII AB SER-IMP: NEGATIVE
T GONDII AB SER-IMP: NEGATIVE
T GONDII IGG SER QL: <3 IU/ML
T GONDII IGM SER QL: <3 AU/ML
VZV AB TITR SER: POSITIVE
VZV IGG SER IF-ACNC: 452.4 INDEX

## 2022-03-28 ENCOUNTER — NON-APPOINTMENT (OUTPATIENT)
Age: 32
End: 2022-03-28

## 2022-03-28 LAB
B19V IGG SER QL IA: 0.24 INDEX
B19V IGG+IGM SER-IMP: NEGATIVE
B19V IGG+IGM SER-IMP: NORMAL
B19V IGM FLD-ACNC: 0.08 INDEX
B19V IGM SER-ACNC: NEGATIVE
LEAD BLD-MCNC: <1 UG/DL
M TB IFN-G BLD-IMP: NEGATIVE
QUANTIFERON TB PLUS MITOGEN MINUS NIL: 9.96 IU/ML
QUANTIFERON TB PLUS NIL: 0.04 IU/ML
QUANTIFERON TB PLUS TB1 MINUS NIL: 0.01 IU/ML
QUANTIFERON TB PLUS TB2 MINUS NIL: 0 IU/ML
T PALLIDUM AB SER QL IA: NEGATIVE

## 2022-03-29 ENCOUNTER — ASOB RESULT (OUTPATIENT)
Age: 32
End: 2022-03-29

## 2022-03-29 ENCOUNTER — APPOINTMENT (OUTPATIENT)
Dept: MATERNAL FETAL MEDICINE | Facility: CLINIC | Age: 32
End: 2022-03-29

## 2022-03-29 LAB
HGB A MFR BLD: 66 %
HGB A2 MFR BLD: 3.3 %
HGB FRACT BLD-IMP: NORMAL
HGB OTHER MFR BLD ELPH: 30.7 %

## 2022-04-05 ENCOUNTER — APPOINTMENT (OUTPATIENT)
Dept: ANTEPARTUM | Facility: CLINIC | Age: 32
End: 2022-04-05
Payer: MEDICAID

## 2022-04-05 ENCOUNTER — ASOB RESULT (OUTPATIENT)
Age: 32
End: 2022-04-05

## 2022-04-05 ENCOUNTER — NON-APPOINTMENT (OUTPATIENT)
Age: 32
End: 2022-04-05

## 2022-04-05 PROCEDURE — 76813 OB US NUCHAL MEAS 1 GEST: CPT

## 2022-04-05 PROCEDURE — 36415 COLL VENOUS BLD VENIPUNCTURE: CPT

## 2022-04-11 DIAGNOSIS — Z3A.10 10 WEEKS GESTATION OF PREGNANCY: ICD-10-CM

## 2022-04-11 LAB
1ST TRIMESTER DATA: NORMAL
ADDENDUM DOC: NORMAL
AFP PNL SERPL: NORMAL
AFP SERPL-ACNC: NORMAL
CLINICAL BIOCHEMIST REVIEW: NORMAL
FREE BETA HCG 1ST TRIMESTER: NORMAL
Lab: NORMAL
NOTES NTD: NORMAL
NT: NORMAL
PAPP-A SERPL-ACNC: NORMAL
TRISOMY 18/3: NORMAL

## 2022-04-14 ENCOUNTER — APPOINTMENT (OUTPATIENT)
Dept: OBGYN | Facility: CLINIC | Age: 32
End: 2022-04-14
Payer: MEDICAID

## 2022-04-14 VITALS
DIASTOLIC BLOOD PRESSURE: 72 MMHG | BODY MASS INDEX: 23.73 KG/M2 | SYSTOLIC BLOOD PRESSURE: 115 MMHG | HEIGHT: 61 IN | WEIGHT: 125.7 LBS

## 2022-04-14 PROCEDURE — 99213 OFFICE O/P EST LOW 20 MIN: CPT | Mod: TH

## 2022-05-02 DIAGNOSIS — Z3A.13 13 WEEKS GESTATION OF PREGNANCY: ICD-10-CM

## 2022-05-05 ENCOUNTER — APPOINTMENT (OUTPATIENT)
Dept: OBGYN | Facility: CLINIC | Age: 32
End: 2022-05-05
Payer: MEDICAID

## 2022-05-05 VITALS
WEIGHT: 125.56 LBS | SYSTOLIC BLOOD PRESSURE: 90 MMHG | BODY MASS INDEX: 23.7 KG/M2 | HEIGHT: 61 IN | DIASTOLIC BLOOD PRESSURE: 60 MMHG

## 2022-05-05 PROCEDURE — 99213 OFFICE O/P EST LOW 20 MIN: CPT | Mod: TH

## 2022-05-10 ENCOUNTER — APPOINTMENT (OUTPATIENT)
Dept: ANTEPARTUM | Facility: CLINIC | Age: 32
End: 2022-05-10
Payer: MEDICAID

## 2022-05-10 PROCEDURE — 36415 COLL VENOUS BLD VENIPUNCTURE: CPT

## 2022-05-13 LAB
1ST TRIMESTER DATA: NORMAL
2ND TRIMESTER DATA: NORMAL
AFP PNL SERPL: NORMAL
AFP SERPL-ACNC: NORMAL
AFP SERPL-ACNC: NORMAL
B-HCG FREE SERPL-MCNC: NORMAL
CLINICAL BIOCHEMIST REVIEW: NORMAL
FREE BETA HCG 1ST TRIMESTER: NORMAL
INHIBIN A SERPL-MCNC: NORMAL
NOTES NTD: NORMAL
NT: NORMAL
PAPP-A SERPL-ACNC: NORMAL
U ESTRIOL SERPL-SCNC: NORMAL

## 2022-05-19 ENCOUNTER — APPOINTMENT (OUTPATIENT)
Dept: OBGYN | Facility: CLINIC | Age: 32
End: 2022-05-19
Payer: MEDICAID

## 2022-05-19 VITALS
HEIGHT: 61 IN | DIASTOLIC BLOOD PRESSURE: 71 MMHG | SYSTOLIC BLOOD PRESSURE: 99 MMHG | BODY MASS INDEX: 24.35 KG/M2 | WEIGHT: 129 LBS

## 2022-05-19 PROCEDURE — 99213 OFFICE O/P EST LOW 20 MIN: CPT | Mod: TH

## 2022-05-23 ENCOUNTER — NON-APPOINTMENT (OUTPATIENT)
Age: 32
End: 2022-05-23

## 2022-05-23 DIAGNOSIS — Z3A.18 18 WEEKS GESTATION OF PREGNANCY: ICD-10-CM

## 2022-05-23 DIAGNOSIS — Z3A.16 16 WEEKS GESTATION OF PREGNANCY: ICD-10-CM

## 2022-05-24 ENCOUNTER — APPOINTMENT (OUTPATIENT)
Dept: OBGYN | Facility: CLINIC | Age: 32
End: 2022-05-24

## 2022-05-31 ENCOUNTER — ASOB RESULT (OUTPATIENT)
Age: 32
End: 2022-05-31

## 2022-05-31 ENCOUNTER — APPOINTMENT (OUTPATIENT)
Dept: ANTEPARTUM | Facility: CLINIC | Age: 32
End: 2022-05-31

## 2022-05-31 PROCEDURE — 76805 OB US >/= 14 WKS SNGL FETUS: CPT

## 2022-05-31 PROCEDURE — 76817 TRANSVAGINAL US OBSTETRIC: CPT

## 2022-06-01 DIAGNOSIS — Z34.91 ENCOUNTER FOR SUPERVISION OF NORMAL PREGNANCY, UNSPECIFIED, FIRST TRIMESTER: ICD-10-CM

## 2022-06-07 ENCOUNTER — NON-APPOINTMENT (OUTPATIENT)
Age: 32
End: 2022-06-07

## 2022-06-09 ENCOUNTER — APPOINTMENT (OUTPATIENT)
Dept: OBGYN | Facility: CLINIC | Age: 32
End: 2022-06-09
Payer: MEDICAID

## 2022-06-09 VITALS
HEIGHT: 61 IN | DIASTOLIC BLOOD PRESSURE: 60 MMHG | WEIGHT: 128.06 LBS | BODY MASS INDEX: 24.18 KG/M2 | SYSTOLIC BLOOD PRESSURE: 90 MMHG

## 2022-06-09 PROCEDURE — 99213 OFFICE O/P EST LOW 20 MIN: CPT | Mod: TH

## 2022-06-20 DIAGNOSIS — Z3A.21 21 WEEKS GESTATION OF PREGNANCY: ICD-10-CM

## 2022-06-24 ENCOUNTER — NON-APPOINTMENT (OUTPATIENT)
Age: 32
End: 2022-06-24

## 2022-06-24 ENCOUNTER — APPOINTMENT (OUTPATIENT)
Dept: OBGYN | Facility: CLINIC | Age: 32
End: 2022-06-24
Payer: MEDICAID

## 2022-06-24 VITALS
SYSTOLIC BLOOD PRESSURE: 114 MMHG | BODY MASS INDEX: 24.49 KG/M2 | DIASTOLIC BLOOD PRESSURE: 80 MMHG | WEIGHT: 129.7 LBS | HEIGHT: 61 IN

## 2022-06-24 PROCEDURE — 99213 OFFICE O/P EST LOW 20 MIN: CPT | Mod: TH

## 2022-06-30 ENCOUNTER — APPOINTMENT (OUTPATIENT)
Dept: OBGYN | Facility: CLINIC | Age: 32
End: 2022-06-30

## 2022-07-08 ENCOUNTER — APPOINTMENT (OUTPATIENT)
Dept: OBGYN | Facility: CLINIC | Age: 32
End: 2022-07-08

## 2022-07-08 VITALS
SYSTOLIC BLOOD PRESSURE: 103 MMHG | BODY MASS INDEX: 24.83 KG/M2 | DIASTOLIC BLOOD PRESSURE: 70 MMHG | HEIGHT: 61 IN | WEIGHT: 131.5 LBS

## 2022-07-08 PROCEDURE — 99213 OFFICE O/P EST LOW 20 MIN: CPT | Mod: TH

## 2022-07-09 LAB
BASOPHILS # BLD AUTO: 0.05 K/UL
BASOPHILS NFR BLD AUTO: 0.6 %
EOSINOPHIL # BLD AUTO: 0.6 K/UL
EOSINOPHIL NFR BLD AUTO: 7 %
GLUCOSE 1H P 50 G GLC PO SERPL-MCNC: 134 MG/DL
HCT VFR BLD CALC: 31.7 %
HGB BLD-MCNC: 9.5 G/DL
IMM GRANULOCYTES NFR BLD AUTO: 0.6 %
LYMPHOCYTES # BLD AUTO: 2.06 K/UL
LYMPHOCYTES NFR BLD AUTO: 24 %
MAN DIFF?: NORMAL
MCHC RBC-ENTMCNC: 25 PG
MCHC RBC-ENTMCNC: 30 GM/DL
MCV RBC AUTO: 83.4 FL
MONOCYTES # BLD AUTO: 0.51 K/UL
MONOCYTES NFR BLD AUTO: 5.9 %
NEUTROPHILS # BLD AUTO: 5.32 K/UL
NEUTROPHILS NFR BLD AUTO: 61.9 %
PLATELET # BLD AUTO: 255 K/UL
RBC # BLD: 3.8 M/UL
RBC # FLD: 18 %
WBC # FLD AUTO: 8.59 K/UL

## 2022-07-12 ENCOUNTER — NON-APPOINTMENT (OUTPATIENT)
Age: 32
End: 2022-07-12

## 2022-07-22 DIAGNOSIS — Z3A.23 23 WEEKS GESTATION OF PREGNANCY: ICD-10-CM

## 2022-07-22 DIAGNOSIS — Z3A.25 25 WEEKS GESTATION OF PREGNANCY: ICD-10-CM

## 2022-07-27 ENCOUNTER — NON-APPOINTMENT (OUTPATIENT)
Age: 32
End: 2022-07-27

## 2022-07-29 ENCOUNTER — APPOINTMENT (OUTPATIENT)
Dept: OBGYN | Facility: CLINIC | Age: 32
End: 2022-07-29

## 2022-07-29 VITALS
HEIGHT: 61 IN | DIASTOLIC BLOOD PRESSURE: 65 MMHG | BODY MASS INDEX: 25.3 KG/M2 | SYSTOLIC BLOOD PRESSURE: 94 MMHG | WEIGHT: 134 LBS

## 2022-07-29 PROCEDURE — 99213 OFFICE O/P EST LOW 20 MIN: CPT | Mod: TH

## 2022-08-04 DIAGNOSIS — Z3A.28 28 WEEKS GESTATION OF PREGNANCY: ICD-10-CM

## 2022-08-11 ENCOUNTER — APPOINTMENT (OUTPATIENT)
Dept: OBGYN | Facility: CLINIC | Age: 32
End: 2022-08-11

## 2022-08-11 VITALS
DIASTOLIC BLOOD PRESSURE: 69 MMHG | WEIGHT: 134.5 LBS | BODY MASS INDEX: 25.39 KG/M2 | HEIGHT: 61 IN | SYSTOLIC BLOOD PRESSURE: 108 MMHG

## 2022-08-11 PROCEDURE — 99213 OFFICE O/P EST LOW 20 MIN: CPT | Mod: TH

## 2022-08-16 ENCOUNTER — ASOB RESULT (OUTPATIENT)
Age: 32
End: 2022-08-16

## 2022-08-16 ENCOUNTER — APPOINTMENT (OUTPATIENT)
Dept: ANTEPARTUM | Facility: CLINIC | Age: 32
End: 2022-08-16

## 2022-08-16 PROCEDURE — 76816 OB US FOLLOW-UP PER FETUS: CPT

## 2022-08-18 DIAGNOSIS — Z3A.30 30 WEEKS GESTATION OF PREGNANCY: ICD-10-CM

## 2022-08-26 ENCOUNTER — NON-APPOINTMENT (OUTPATIENT)
Age: 32
End: 2022-08-26

## 2022-08-30 ENCOUNTER — APPOINTMENT (OUTPATIENT)
Dept: OBGYN | Facility: CLINIC | Age: 32
End: 2022-08-30

## 2022-08-30 ENCOUNTER — OUTPATIENT (OUTPATIENT)
Dept: OUTPATIENT SERVICES | Facility: HOSPITAL | Age: 32
LOS: 1 days | Discharge: HOME | End: 2022-08-30

## 2022-08-30 VITALS
BODY MASS INDEX: 25.7 KG/M2 | WEIGHT: 136.13 LBS | HEIGHT: 61 IN | SYSTOLIC BLOOD PRESSURE: 97 MMHG | DIASTOLIC BLOOD PRESSURE: 67 MMHG | HEART RATE: 84 BPM

## 2022-08-30 DIAGNOSIS — O35.1XX0 MATERNAL CARE FOR (SUSPECTED) CHROMOSOMAL ABNORMALITY IN FETUS, NOT APPLICABLE OR UNSPECIFIED: ICD-10-CM

## 2022-08-30 DIAGNOSIS — Z34.92 ENCOUNTER FOR SUPERVISION OF NORMAL PREGNANCY, UNSPECIFIED, SECOND TRIMESTER: ICD-10-CM

## 2022-08-30 DIAGNOSIS — D64.9 ANEMIA, UNSPECIFIED: ICD-10-CM

## 2022-08-30 DIAGNOSIS — Z98.890 OTHER SPECIFIED POSTPROCEDURAL STATES: Chronic | ICD-10-CM

## 2022-08-30 PROCEDURE — 99213 OFFICE O/P EST LOW 20 MIN: CPT | Mod: TH

## 2022-08-31 ENCOUNTER — RESULT REVIEW (OUTPATIENT)
Age: 32
End: 2022-08-31

## 2022-08-31 ENCOUNTER — APPOINTMENT (OUTPATIENT)
Dept: HEMATOLOGY ONCOLOGY | Facility: CLINIC | Age: 32
End: 2022-08-31

## 2022-08-31 ENCOUNTER — NON-APPOINTMENT (OUTPATIENT)
Age: 32
End: 2022-08-31

## 2022-08-31 VITALS
SYSTOLIC BLOOD PRESSURE: 96 MMHG | OXYGEN SATURATION: 100 % | HEART RATE: 85 BPM | BODY MASS INDEX: 25.68 KG/M2 | DIASTOLIC BLOOD PRESSURE: 65 MMHG | HEIGHT: 61 IN | WEIGHT: 136 LBS

## 2022-08-31 LAB
BASOPHILS # BLD AUTO: 0.1 K/UL — SIGNIFICANT CHANGE UP (ref 0–0.2)
BASOPHILS NFR BLD AUTO: 0.6 % — SIGNIFICANT CHANGE UP (ref 0–2)
EOSINOPHIL # BLD AUTO: 0.5 K/UL — SIGNIFICANT CHANGE UP (ref 0–0.5)
EOSINOPHIL NFR BLD AUTO: 4.5 % — SIGNIFICANT CHANGE UP (ref 0–6)
HCT VFR BLD CALC: 30.1 % — LOW (ref 34.5–45)
HGB BLD-MCNC: 9.4 G/DL — LOW (ref 11.5–15.5)
LYMPHOCYTES # BLD AUTO: 2.6 K/UL — SIGNIFICANT CHANGE UP (ref 1–3.3)
LYMPHOCYTES # BLD AUTO: 25.6 % — SIGNIFICANT CHANGE UP (ref 13–44)
MCHC RBC-ENTMCNC: 23.7 PG — LOW (ref 27–34)
MCHC RBC-ENTMCNC: 31.4 G/DL — LOW (ref 32–36)
MCV RBC AUTO: 75.6 FL — LOW (ref 80–100)
MONOCYTES # BLD AUTO: 0.6 K/UL — SIGNIFICANT CHANGE UP (ref 0–0.9)
MONOCYTES NFR BLD AUTO: 5.6 % — SIGNIFICANT CHANGE UP (ref 2–14)
NEUTROPHILS # BLD AUTO: 6.4 K/UL — SIGNIFICANT CHANGE UP (ref 1.8–7.4)
NEUTROPHILS NFR BLD AUTO: 63.7 % — SIGNIFICANT CHANGE UP (ref 43–77)
PLATELET # BLD AUTO: 287 K/UL — SIGNIFICANT CHANGE UP (ref 150–400)
RBC # BLD: 3.98 M/UL — SIGNIFICANT CHANGE UP (ref 3.8–5.2)
RBC # FLD: 15.2 % — HIGH (ref 10.3–14.5)
WBC # BLD: 10 K/UL — SIGNIFICANT CHANGE UP (ref 3.8–10.5)
WBC # FLD AUTO: 10 K/UL — SIGNIFICANT CHANGE UP (ref 3.8–10.5)

## 2022-08-31 PROCEDURE — 99205 OFFICE O/P NEW HI 60 MIN: CPT

## 2022-09-01 ENCOUNTER — NON-APPOINTMENT (OUTPATIENT)
Age: 32
End: 2022-09-01

## 2022-09-01 LAB
ALBUMIN SERPL ELPH-MCNC: 3.5 G/DL
ALP BLD-CCNC: 94 U/L
ALT SERPL-CCNC: 7 U/L
ANION GAP SERPL CALC-SCNC: 12 MMOL/L
AST SERPL-CCNC: 17 U/L
BILIRUB SERPL-MCNC: 0.2 MG/DL
BUN SERPL-MCNC: 8 MG/DL
CALCIUM SERPL-MCNC: 8.7 MG/DL
CHLORIDE SERPL-SCNC: 106 MMOL/L
CO2 SERPL-SCNC: 19 MMOL/L
CREAT SERPL-MCNC: 0.63 MG/DL
EGFR: 119 ML/MIN/1.73M2
FERRITIN SERPL-MCNC: 7 NG/ML
FOLATE SERPL-MCNC: 19.9 NG/ML
GLUCOSE SERPL-MCNC: 109 MG/DL
IRON SATN MFR SERPL: 4 %
IRON SERPL-MCNC: 20 UG/DL
POTASSIUM SERPL-SCNC: 3.5 MMOL/L
PROT SERPL-MCNC: 5.7 G/DL
RBC # BLD: 3.82 M/UL
RETICS # AUTO: 1.7 %
RETICS AGGREG/RBC NFR: 66.5 K/UL
SODIUM SERPL-SCNC: 137 MMOL/L
TIBC SERPL-MCNC: 502 UG/DL
UIBC SERPL-MCNC: 483 UG/DL
VIT B12 SERPL-MCNC: 249 PG/ML

## 2022-09-08 DIAGNOSIS — Z3A.32 32 WEEKS GESTATION OF PREGNANCY: ICD-10-CM

## 2022-09-09 NOTE — HISTORY OF PRESENT ILLNESS
[de-identified] :  OLINDA LARSON is a 32 year old F with no signifcant past medical hx  now referred during 5th pregnancy with anemia\par \par \par Prior children:   , , , 2019 \par \par Patient had required a PRBC transfusion with 3rd pregnancy ( 7 months)  in [place] with issues with placental issues \par Patient has never received IV iron in the past \par \par 22 WBC 10, Hb 9.4, Plt 287 \par 22: wbc 8.5 Hb 9.5 Plt 255\par \par 5/15/20: WBC 9.06, Hb 13.5 Plt 314 \par \par Hb electrophoreiss in 2019 and in : Hb A 66, Hb A 2 3.3, \par Beta-chain variant trait. Capillary hemoglobin electrophoresis and HPLC indicate variant band in a position near hemoglobin [place] (zone 11 in capillary; retention time of 1.82 by HPLC); no denatured hemoglobin A present. Solubility is negative. Suggest clinical correlation and family studies. If clinically indicated, submit specimen for definitive identification by reference laboratory.  [de-identified] : Patient is currently 33 weeks pregnant, and on oral iron once daily 65 Meq \par Able to tolerate iron taking it daily \par + constipation, does not use the stool softeners\par + fatigue/ some sob

## 2022-09-12 ENCOUNTER — NON-APPOINTMENT (OUTPATIENT)
Age: 32
End: 2022-09-12

## 2022-09-13 ENCOUNTER — APPOINTMENT (OUTPATIENT)
Dept: OBGYN | Facility: CLINIC | Age: 32
End: 2022-09-13

## 2022-09-13 VITALS
WEIGHT: 136.5 LBS | SYSTOLIC BLOOD PRESSURE: 98 MMHG | BODY MASS INDEX: 25.77 KG/M2 | DIASTOLIC BLOOD PRESSURE: 64 MMHG | HEIGHT: 61 IN

## 2022-09-13 PROCEDURE — 99213 OFFICE O/P EST LOW 20 MIN: CPT | Mod: TH

## 2022-09-20 ENCOUNTER — APPOINTMENT (OUTPATIENT)
Dept: OBGYN | Facility: CLINIC | Age: 32
End: 2022-09-20

## 2022-09-20 VITALS
WEIGHT: 140.06 LBS | DIASTOLIC BLOOD PRESSURE: 62 MMHG | SYSTOLIC BLOOD PRESSURE: 92 MMHG | BODY MASS INDEX: 26.44 KG/M2 | HEIGHT: 61 IN

## 2022-09-20 PROCEDURE — 99213 OFFICE O/P EST LOW 20 MIN: CPT | Mod: TH

## 2022-09-23 LAB
GP B STREP DNA SPEC QL NAA+PROBE: DETECTED
GP B STREP DNA SPEC QL NAA+PROBE: NORMAL
HIV1+2 AB SPEC QL IA.RAPID: NONREACTIVE
SOURCE GBS: NORMAL
T PALLIDUM AB SER QL IA: NEGATIVE

## 2022-09-26 ENCOUNTER — NON-APPOINTMENT (OUTPATIENT)
Age: 32
End: 2022-09-26

## 2022-09-27 ENCOUNTER — NON-APPOINTMENT (OUTPATIENT)
Age: 32
End: 2022-09-27

## 2022-09-27 ENCOUNTER — ASOB RESULT (OUTPATIENT)
Age: 32
End: 2022-09-27

## 2022-09-27 ENCOUNTER — APPOINTMENT (OUTPATIENT)
Dept: ANTEPARTUM | Facility: CLINIC | Age: 32
End: 2022-09-27

## 2022-09-27 ENCOUNTER — APPOINTMENT (OUTPATIENT)
Dept: OBGYN | Facility: CLINIC | Age: 32
End: 2022-09-27

## 2022-09-27 VITALS
WEIGHT: 142 LBS | DIASTOLIC BLOOD PRESSURE: 68 MMHG | HEIGHT: 61 IN | BODY MASS INDEX: 26.81 KG/M2 | SYSTOLIC BLOOD PRESSURE: 104 MMHG

## 2022-09-27 DIAGNOSIS — Z3A.35 35 WEEKS GESTATION OF PREGNANCY: ICD-10-CM

## 2022-09-27 DIAGNOSIS — Z3A.36 36 WEEKS GESTATION OF PREGNANCY: ICD-10-CM

## 2022-09-27 DIAGNOSIS — Z3A.37 37 WEEKS GESTATION OF PREGNANCY: ICD-10-CM

## 2022-09-27 PROCEDURE — 76816 OB US FOLLOW-UP PER FETUS: CPT

## 2022-09-27 PROCEDURE — 99213 OFFICE O/P EST LOW 20 MIN: CPT | Mod: TH

## 2022-09-27 NOTE — OB PROVIDER TRIAGE NOTE - NSINFECTIONS_OBGYN_ALL_OB
[address] [facility]  [address]  [facility] [address]  Phone: [phone]  Fax: [phone]    LINWOOD Still CNP        September 27, 2022     Patient: Jackie Zelaya   YOB: 1998   Date of Visit: 9/27/2022       To Whom It May Concern: It is my medical opinion that Jackie Zelaya may return to work on 9/30/2022. Please excuse recent absences due to acute illness. If you have any questions or concerns, please don't hesitate to call.     Sincerely,        LINWOOD Still CNP None

## 2022-09-28 ENCOUNTER — APPOINTMENT (OUTPATIENT)
Dept: HEMATOLOGY ONCOLOGY | Facility: CLINIC | Age: 32
End: 2022-09-28

## 2022-10-04 ENCOUNTER — APPOINTMENT (OUTPATIENT)
Dept: OBGYN | Facility: CLINIC | Age: 32
End: 2022-10-04

## 2022-10-04 VITALS
HEIGHT: 61 IN | DIASTOLIC BLOOD PRESSURE: 71 MMHG | SYSTOLIC BLOOD PRESSURE: 112 MMHG | BODY MASS INDEX: 27.06 KG/M2 | WEIGHT: 143.3 LBS

## 2022-10-04 PROCEDURE — 99214 OFFICE O/P EST MOD 30 MIN: CPT | Mod: TH

## 2022-10-06 ENCOUNTER — OUTPATIENT (OUTPATIENT)
Dept: INPATIENT UNIT | Facility: HOSPITAL | Age: 32
LOS: 1 days | End: 2022-10-06

## 2022-10-06 VITALS — HEART RATE: 90 BPM | DIASTOLIC BLOOD PRESSURE: 61 MMHG | SYSTOLIC BLOOD PRESSURE: 100 MMHG

## 2022-10-06 VITALS — TEMPERATURE: 98 F

## 2022-10-06 DIAGNOSIS — O47.1 FALSE LABOR AT OR AFTER 37 COMPLETED WEEKS OF GESTATION: ICD-10-CM

## 2022-10-06 DIAGNOSIS — Z98.890 OTHER SPECIFIED POSTPROCEDURAL STATES: Chronic | ICD-10-CM

## 2022-10-06 PROCEDURE — 99212 OFFICE O/P EST SF 10 MIN: CPT | Mod: TH

## 2022-10-06 NOTE — OB PROVIDER TRIAGE NOTE - ATTENDING COMMENTS
32y  at 38w1d who presents to L&D for decreased fetal movement now resolved with reassuring fetal testing including reactive NST and SUNNY of 9, return precautions discussed.

## 2022-10-06 NOTE — OB PROVIDER TRIAGE NOTE - HISTORY OF PRESENT ILLNESS
OLINDA LARSON is a 32y  at 38w1d who presents to L&D for decreased fetal movement. She states that yesterday she bent down to pick something up and felt sharp suprapubic pain with cramping. Since then, she has also been. She endorses some blood tinged vaginal discharge/mucus. She denies leakage of fluid.    Pregnancy course is uncomplicated.     POB: NSVDx4 (2006, ,  - retained placenta requiring blood transfusion, 2019). SAB requiring D&C  PGYN: -fibroids/-cysts, denies STD hx, denies abnormal PAPs  PMH: none  PSH: D&C  SH: Denies tobacco use, EtOH use and illicit drug use during the pregnancy; Feels safe at home  Meds: double iron therapy  All: NKDA OLINDA LARSON is a 32y  at 38w1d who presents to L&D for decreased fetal movement. She states that yesterday she bent down to pick something up and felt sharp suprapubic pain with cramping. Since then, she has also been noticing less fetal movement (about 2-3 times/hour instead of the usual). She endorses some blood tinged vaginal discharge/mucus. She denies leakage of fluid.    Pregnancy course is uncomplicated.     POB: NSVDx4 (, ,  - retained placenta requiring blood transfusion, 2019). SAB requiring D&C  PGYN: -fibroids/-cysts, denies STD hx, denies abnormal PAPs  PMH: none  PSH: D&C  SH: Denies tobacco use, EtOH use and illicit drug use during the pregnancy; Feels safe at home  Meds: double iron therapy  All: NKDA

## 2022-10-06 NOTE — OB RN TRIAGE NOTE - NS_OBGYNHISTORY_OBGYN_ALL_OB_FT
Olegario Hernandez was seen and treated in our emergency department on 4/19/2021  Diagnosis:     Anayeli Brown  may return to school on return date  He may return on this date: 04/21/2021         If you have any questions or concerns, please don't hesitate to call        Shanell Kelsey, DO    ______________________________           _______________          _______________  Hospital Representative                              Date                                Time  '06 7# f  '08 7# f  '12 7# f  SABx1 c D&C ( does not know)  '19 8.9 M

## 2022-10-06 NOTE — OB PROVIDER TRIAGE NOTE - NSOBPROVIDERNOTE_OBGYN_ALL_OB_FT
A/P: OLINDA LARSON is a 32y  at 38w1d who presents to L&D for decreased fetal movement.   Fetus: reactive and reassuring  [place]: irregular  BPP   Dispo: Gave strict return precautions. Has follow-up appointment with Dr. Lebron on Tuesday.     Discussed with Dr. Bassett

## 2022-10-06 NOTE — OB PROVIDER TRIAGE NOTE - NSHPPHYSICALEXAM_GEN_ALL_CORE
T(C): 36.8 (10-06-22 @ 16:29), Max: 36.8 (10-06-22 @ 16:29)  HR: 90 (10-06-22 @ 16:29) (90 - 90)  BP: 100/61 (10-06-22 @ 16:29) (100/61 - 100/61)  RR: 16 (10-06-22 @ 16:29) (16 - 16)    Gen: NAD, well-appearing  Heart: S1 S2, RRR  Lungs: CTAB  Abd: soft, gravid  Ext: non-edematous, non-tender     SVE: 1.5/30/-3 @1730    FHT: 140/moderate variability/+accels/-decels  [place]: irregular  Bedside sono: gross fetal movement, breathing, and tone noted; SUNNY 9

## 2022-10-10 ENCOUNTER — NON-APPOINTMENT (OUTPATIENT)
Age: 32
End: 2022-10-10

## 2022-10-10 ENCOUNTER — APPOINTMENT (OUTPATIENT)
Dept: OBGYN | Facility: CLINIC | Age: 32
End: 2022-10-10

## 2022-10-10 VITALS
WEIGHT: 142.38 LBS | BODY MASS INDEX: 26.88 KG/M2 | DIASTOLIC BLOOD PRESSURE: 70 MMHG | HEIGHT: 61 IN | SYSTOLIC BLOOD PRESSURE: 100 MMHG

## 2022-10-10 DIAGNOSIS — Z3A.37 37 WEEKS GESTATION OF PREGNANCY: ICD-10-CM

## 2022-10-10 DIAGNOSIS — B95.1 STREPTOCOCCUS, GROUP B, AS THE CAUSE OF DISEASES CLASSIFIED ELSEWHERE: ICD-10-CM

## 2022-10-10 DIAGNOSIS — D64.9 ANEMIA, UNSPECIFIED: ICD-10-CM

## 2022-10-10 PROCEDURE — 99213 OFFICE O/P EST LOW 20 MIN: CPT | Mod: TH

## 2022-10-11 ENCOUNTER — APPOINTMENT (OUTPATIENT)
Dept: OBGYN | Facility: CLINIC | Age: 32
End: 2022-10-11

## 2022-10-11 DIAGNOSIS — Z3A.38 38 WEEKS GESTATION OF PREGNANCY: ICD-10-CM

## 2022-10-17 ENCOUNTER — APPOINTMENT (OUTPATIENT)
Dept: OBGYN | Facility: CLINIC | Age: 32
End: 2022-10-17

## 2022-10-17 ENCOUNTER — NON-APPOINTMENT (OUTPATIENT)
Age: 32
End: 2022-10-17

## 2022-10-17 ENCOUNTER — INPATIENT (INPATIENT)
Facility: HOSPITAL | Age: 32
LOS: 1 days | Discharge: HOME | End: 2022-10-19
Attending: OBSTETRICS & GYNECOLOGY | Admitting: OBSTETRICS & GYNECOLOGY
Payer: COMMERCIAL

## 2022-10-17 VITALS
HEIGHT: 61 IN | SYSTOLIC BLOOD PRESSURE: 96 MMHG | DIASTOLIC BLOOD PRESSURE: 65 MMHG | BODY MASS INDEX: 27.6 KG/M2 | WEIGHT: 146.19 LBS

## 2022-10-17 VITALS
TEMPERATURE: 99 F | DIASTOLIC BLOOD PRESSURE: 59 MMHG | SYSTOLIC BLOOD PRESSURE: 104 MMHG | HEART RATE: 67 BPM | RESPIRATION RATE: 18 BRPM

## 2022-10-17 DIAGNOSIS — O26.893 OTHER SPECIFIED PREGNANCY RELATED CONDITIONS, THIRD TRIMESTER: ICD-10-CM

## 2022-10-17 DIAGNOSIS — O47.1 FALSE LABOR AT OR AFTER 37 COMPLETED WEEKS OF GESTATION: ICD-10-CM

## 2022-10-17 DIAGNOSIS — Z98.890 OTHER SPECIFIED POSTPROCEDURAL STATES: Chronic | ICD-10-CM

## 2022-10-17 LAB
ANISOCYTOSIS BLD QL: SIGNIFICANT CHANGE UP
BASOPHILS # BLD AUTO: 0 K/UL — SIGNIFICANT CHANGE UP (ref 0–0.2)
BASOPHILS NFR BLD AUTO: 0 % — SIGNIFICANT CHANGE UP (ref 0–2)
BLD GP AB SCN SERPL QL: SIGNIFICANT CHANGE UP
EOSINOPHIL # BLD AUTO: 0.18 K/UL — SIGNIFICANT CHANGE UP (ref 0–0.5)
EOSINOPHIL NFR BLD AUTO: 1.8 % — SIGNIFICANT CHANGE UP (ref 0–6)
GIANT PLATELETS BLD QL SMEAR: PRESENT — SIGNIFICANT CHANGE UP
HCT VFR BLD CALC: 30.4 % — LOW (ref 34.5–45)
HGB BLD-MCNC: 9.2 G/DL — LOW (ref 11.5–15.5)
LYMPHOCYTES # BLD AUTO: 2.61 K/UL — SIGNIFICANT CHANGE UP (ref 1–3.3)
LYMPHOCYTES # BLD AUTO: 25.9 % — SIGNIFICANT CHANGE UP (ref 13–44)
MANUAL SMEAR VERIFICATION: SIGNIFICANT CHANGE UP
MCHC RBC-ENTMCNC: 21.3 PG — LOW (ref 27–34)
MCHC RBC-ENTMCNC: 30.3 GM/DL — LOW (ref 32–36)
MCV RBC AUTO: 70.5 FL — LOW (ref 80–100)
MICROCYTES BLD QL: SIGNIFICANT CHANGE UP
MONOCYTES # BLD AUTO: 0.45 K/UL — SIGNIFICANT CHANGE UP (ref 0–0.9)
MONOCYTES NFR BLD AUTO: 4.5 % — SIGNIFICANT CHANGE UP (ref 2–14)
NEUTROPHILS # BLD AUTO: 6.84 K/UL — SIGNIFICANT CHANGE UP (ref 1.8–7.4)
NEUTROPHILS NFR BLD AUTO: 67.8 % — SIGNIFICANT CHANGE UP (ref 43–77)
NRBC # BLD: 1 /100 — HIGH (ref 0–0)
PLAT MORPH BLD: NORMAL — SIGNIFICANT CHANGE UP
PLATELET # BLD AUTO: 306 K/UL — SIGNIFICANT CHANGE UP (ref 150–400)
POIKILOCYTOSIS BLD QL AUTO: SLIGHT — SIGNIFICANT CHANGE UP
POLYCHROMASIA BLD QL SMEAR: SIGNIFICANT CHANGE UP
RBC # BLD: 4.31 M/UL — SIGNIFICANT CHANGE UP (ref 3.8–5.2)
RBC # FLD: 17.8 % — HIGH (ref 10.3–14.5)
RBC BLD AUTO: ABNORMAL
SARS-COV-2 RNA SPEC QL NAA+PROBE: SIGNIFICANT CHANGE UP
SMUDGE CELLS # BLD: PRESENT — SIGNIFICANT CHANGE UP
WBC # BLD: 10.09 K/UL — SIGNIFICANT CHANGE UP (ref 3.8–10.5)
WBC # FLD AUTO: 10.09 K/UL — SIGNIFICANT CHANGE UP (ref 3.8–10.5)

## 2022-10-17 PROCEDURE — 99213 OFFICE O/P EST LOW 20 MIN: CPT | Mod: TH

## 2022-10-17 RX ORDER — SODIUM CHLORIDE 9 MG/ML
1000 INJECTION, SOLUTION INTRAVENOUS
Refills: 0 | Status: DISCONTINUED | OUTPATIENT
Start: 2022-10-17 | End: 2022-10-18

## 2022-10-17 RX ORDER — OXYTOCIN 10 UNIT/ML
333.33 VIAL (ML) INJECTION
Qty: 20 | Refills: 0 | Status: COMPLETED | OUTPATIENT
Start: 2022-10-17 | End: 2022-10-18

## 2022-10-17 RX ORDER — AMPICILLIN TRIHYDRATE 250 MG
1 CAPSULE ORAL EVERY 4 HOURS
Refills: 0 | Status: DISCONTINUED | OUTPATIENT
Start: 2022-10-17 | End: 2022-10-18

## 2022-10-17 RX ORDER — CITRIC ACID/SODIUM CITRATE 300-500 MG
30 SOLUTION, ORAL ORAL ONCE
Refills: 0 | Status: COMPLETED | OUTPATIENT
Start: 2022-10-17 | End: 2022-10-18

## 2022-10-17 RX ORDER — AMPICILLIN TRIHYDRATE 250 MG
2 CAPSULE ORAL ONCE
Refills: 0 | Status: COMPLETED | OUTPATIENT
Start: 2022-10-17 | End: 2022-10-18

## 2022-10-17 RX ORDER — CHLORHEXIDINE GLUCONATE 213 G/1000ML
1 SOLUTION TOPICAL ONCE
Refills: 0 | Status: DISCONTINUED | OUTPATIENT
Start: 2022-10-17 | End: 2022-10-18

## 2022-10-17 NOTE — OB PROVIDER H&P - HISTORY OF PRESENT ILLNESS
Patient is a 33yo  at 39w5d presenting from the office for non-reactive NST. She denies VB, LOF. She feels abdominal tightening but no painful ctxs and has good fetal movement.    Prenatal course uncomplicated.    Obhx:  - NSVDx4 ('06, '08, '10, ), Last weighed 0lpl6tv. Delivery in 2019 c/b retained placenta requiring blood transfusion  - SABx1 2017  Medhx: x  Surghx: D&C 2017  Meds: x  All: NKDA

## 2022-10-17 NOTE — OB PROVIDER H&P - NSICDXPASTMEDICALHX_GEN_ALL_CORE_FT
PAST MEDICAL HISTORY:  Anemia      (normal spontaneous vaginal delivery) 2006, 2008, 2010, 2019    Spontaneous  x1 2017

## 2022-10-17 NOTE — OB PROVIDER H&P - NSPPHRISKEVAL_OBGYN_ALL_OB
Received faxed refill request for this medication from the pharmacy that is on file. clonazePAM (KLONOPIN) 1 mg tablet  Take 1 Tab by mouth daily. Max Daily Amount: 1 mg., Normal   Due office visit   Disp-60 Tab, R-0    zolpidem (AMBIEN) 5 mg tablet  Take 1 Tab by mouth nightly as needed for Sleep.  Max Daily Amount: 5 mg., Normal, Disp-30 Tab, R-0 Grand parity (>4 full term pregnancies)/Retained placenta

## 2022-10-17 NOTE — OB RN TRIAGE NOTE - FALL HARM RISK - UNIVERSAL INTERVENTIONS
Bed in lowest position, wheels locked, appropriate side rails in place/Call bell, personal items and telephone in reach/Instruct patient to call for assistance before getting out of bed or chair/Non-slip footwear when patient is out of bed/[place] to call system/Physically safe environment - no spills, clutter or unnecessary equipment/Purposeful Proactive Rounding/Room/bathroom lighting operational, light cord in reach

## 2022-10-17 NOTE — OB PROVIDER H&P - ASSESSMENT
Patient is a 33yo  at 39w5d admitted in early labor/for IOL for abnormal  testing not also not reactive on NST here in triage  - Consent  - Admission labs  - GBS positive, ampicillin ordered  - Hemorrhage risk given prior NSVDx4, hx of retained placenta requiring transfusion. Will order 2u on hold    Plan D/w Dr. Lebron

## 2022-10-17 NOTE — OB RN PATIENT PROFILE - NSTOBACCONEVERSMOKERY/N_GEN_A
Problem: Prexisting or High Potential for Compromised Skin Integrity  Goal: Skin integrity is maintained or improved  Description: INTERVENTIONS:  - Identify patients at risk for skin breakdown  - Assess and monitor skin integrity  - Assess and monitor nutrition and hydration status  - Monitor labs   - Assess for incontinence   - Turn and reposition patient  - Assist with mobility/ambulation  - Relieve pressure over bony prominences  - Avoid friction and shearing  - Provide appropriate hygiene as needed including keeping skin clean and dry  - Evaluate need for skin moisturizer/barrier cream  - Collaborate with interdisciplinary team   - Patient/family teaching  - Consider wound care consult   Outcome: Adequate for Discharge     Problem: PAIN - ADULT  Goal: Verbalizes/displays adequate comfort level or baseline comfort level  Description: Interventions:  - Encourage patient to monitor pain and request assistance  - Assess pain using appropriate pain scale  - Administer analgesics based on type and severity of pain and evaluate response  - Implement non-pharmacological measures as appropriate and evaluate response  - Consider cultural and social influences on pain and pain management  - Notify physician/advanced practitioner if interventions unsuccessful or patient reports new pain  Outcome: Adequate for Discharge     Problem: INFECTION - ADULT  Goal: Absence or prevention of progression during hospitalization  Description: INTERVENTIONS:  - Assess and monitor for signs and symptoms of infection  - Monitor lab/diagnostic results  - Monitor all insertion sites, i e  indwelling lines, tubes, and drains  - Monitor endotracheal if appropriate and nasal secretions for changes in amount and color  - [place] appropriate cooling/warming therapies per order  - Administer medications as ordered  - Instruct and encourage patient and family to use good hand hygiene technique  - Identify and instruct in appropriate isolation precautions for identified infection/condition  Outcome: Adequate for Discharge  Goal: Absence of fever/infection during neutropenic period  Description: INTERVENTIONS:  - Monitor WBC    Outcome: Adequate for Discharge     Problem: SAFETY ADULT  Goal: Patient will remain free of falls  Description: INTERVENTIONS:  - Assess patient frequently for physical needs  -  Identify cognitive and physical deficits and behaviors that affect risk of falls    -  [place] fall precautions as indicated by assessment   - Educate patient/family on patient safety including physical limitations  - Instruct patient to call for assistance with activity based on assessment  - Modify environment to reduce risk of injury  - Consider OT/PT consult to assist with strengthening/mobility  Outcome: Adequate for Discharge  Goal: Maintain or return to baseline ADL function  Description: INTERVENTIONS:  -  Assess patient's ability to carry out ADLs; assess patient's baseline for ADL function and identify physical deficits which impact ability to perform ADLs (bathing, care of mouth/teeth, toileting, grooming, dressing, etc )  - Assess/evaluate cause of self-care deficits   - Assess range of motion  - Assess patient's mobility; develop plan if impaired  - Assess patient's need for assistive devices and provide as appropriate  - Encourage maximum independence but intervene and supervise when necessary  - Involve family in performance of ADLs  - Assess for home care needs following discharge   - Consider OT consult to assist with ADL evaluation and planning for discharge  - Provide patient education as appropriate  Outcome: Adequate for Discharge  Goal: Maintain or return mobility status to optimal level  Description: INTERVENTIONS:  - Assess patient's baseline mobility status (ambulation, transfers, stairs, etc )    - Identify cognitive and physical deficits and behaviors that affect mobility  - Identify mobility aids required to assist with transfers and/or ambulation (gait belt, sit-to-stand, lift, walker, cane, etc )  - [place] fall precautions as indicated by assessment  - Record patient progress and toleration of activity level on Mobility SBAR; progress patient to next Phase/Stage  - Instruct patient to call for assistance with activity based on assessment  - Consider rehabilitation consult to assist with strengthening/weightbearing, etc   Outcome: Adequate for Discharge     Problem: DISCHARGE PLANNING  Goal: Discharge to home or other facility with appropriate resources  Description: INTERVENTIONS:  - Identify barriers to discharge w/patient and caregiver  - Arrange for needed discharge resources and transportation as appropriate  - Identify discharge learning needs (meds, wound care, etc )  - Arrange for interpretive services to assist at discharge as needed  - Refer to Case Management Department for coordinating discharge planning if the patient needs post-hospital services based on physician/advanced practitioner order or complex needs related to functional status, cognitive ability, or social support system  Outcome: Adequate for Discharge     Problem: Knowledge Deficit  Goal: Patient/family/caregiver demonstrates understanding of disease process, treatment plan, medications, and discharge instructions  Description: Complete learning assessment and assess knowledge base  Interventions:  - Provide teaching at level of understanding  - Provide teaching via preferred learning methods  Outcome: Adequate for Discharge     Problem: Potential for Falls  Goal: Patient will remain free of falls  Description: INTERVENTIONS:  - Assess patient frequently for physical needs  -  Identify cognitive and physical deficits and behaviors that affect risk of falls    -  [place] fall precautions as indicated by assessment   - Educate patient/family on patient safety including physical limitations  - Instruct patient to call for assistance with activity based on assessment  - Modify environment to reduce risk of injury  - Consider OT/PT consult to assist with strengthening/mobility  Outcome: Adequate for Discharge No

## 2022-10-17 NOTE — OB PROVIDER H&P - NSHPPHYSICALEXAM_GEN_ALL_CORE
Vital Signs Last 24 Hrs  T(C): 37.2 (17 Oct 2022 20:00), Max: 37.2 (17 Oct 2022 20:00)  T(F): 98.96 (17 Oct 2022 20:00), Max: 98.96 (17 Oct 2022 20:00)  HR: 67 (17 Oct 2022 19:59) (67 - 67)  BP: 104/59 (17 Oct 2022 19:59) (104/59 - 104/59)    Physical Exam  General: Alert and oriented x3, NAD  Heart: RRR  Lungs: CTAB  Abd: Soft, nontender, gravid  SVE: 3/50/-2  Sono:  FHT: 150bpm - mod variability -accels -decels  [place]: Ctxs every 3-4min

## 2022-10-18 ENCOUNTER — TRANSCRIPTION ENCOUNTER (OUTPATIENT)
Age: 32
End: 2022-10-18

## 2022-10-18 LAB
COVID-19 SPIKE DOMAIN AB INTERP: POSITIVE
COVID-19 SPIKE DOMAIN ANTIBODY RESULT: 77.7 U/ML — HIGH
SARS-COV-2 IGG+IGM SERPL QL IA: 77.7 U/ML — HIGH
SARS-COV-2 IGG+IGM SERPL QL IA: POSITIVE
T PALLIDUM AB TITR SER: NEGATIVE — SIGNIFICANT CHANGE UP

## 2022-10-18 PROCEDURE — 59409 OBSTETRICAL CARE: CPT | Mod: U9

## 2022-10-18 RX ORDER — TETANUS TOXOID, REDUCED DIPHTHERIA TOXOID AND ACELLULAR PERTUSSIS VACCINE, ADSORBED 5; 2.5; 8; 8; 2.5 [IU]/.5ML; [IU]/.5ML; UG/.5ML; UG/.5ML; UG/.5ML
0.5 SUSPENSION INTRAMUSCULAR ONCE
Refills: 0 | Status: DISCONTINUED | OUTPATIENT
Start: 2022-10-18 | End: 2022-10-19

## 2022-10-18 RX ORDER — LANOLIN
1 OINTMENT (GRAM) TOPICAL EVERY 6 HOURS
Refills: 0 | Status: DISCONTINUED | OUTPATIENT
Start: 2022-10-18 | End: 2022-10-19

## 2022-10-18 RX ORDER — OXYTOCIN 10 UNIT/ML
2 VIAL (ML) INJECTION
Qty: 30 | Refills: 0 | Status: DISCONTINUED | OUTPATIENT
Start: 2022-10-18 | End: 2022-10-19

## 2022-10-18 RX ORDER — HYDROCORTISONE 1 %
1 OINTMENT (GRAM) TOPICAL EVERY 6 HOURS
Refills: 0 | Status: DISCONTINUED | OUTPATIENT
Start: 2022-10-18 | End: 2022-10-19

## 2022-10-18 RX ORDER — IBUPROFEN 200 MG
600 TABLET ORAL EVERY 6 HOURS
Refills: 0 | Status: COMPLETED | OUTPATIENT
Start: 2022-10-18 | End: 2023-09-16

## 2022-10-18 RX ORDER — OXYCODONE HYDROCHLORIDE 5 MG/1
5 TABLET ORAL ONCE
Refills: 0 | Status: DISCONTINUED | OUTPATIENT
Start: 2022-10-18 | End: 2022-10-19

## 2022-10-18 RX ORDER — SODIUM CHLORIDE 9 MG/ML
3 INJECTION INTRAMUSCULAR; INTRAVENOUS; SUBCUTANEOUS EVERY 8 HOURS
Refills: 0 | Status: DISCONTINUED | OUTPATIENT
Start: 2022-10-18 | End: 2022-10-19

## 2022-10-18 RX ORDER — IBUPROFEN 200 MG
600 TABLET ORAL EVERY 6 HOURS
Refills: 0 | Status: DISCONTINUED | OUTPATIENT
Start: 2022-10-18 | End: 2022-10-19

## 2022-10-18 RX ORDER — OXYCODONE HYDROCHLORIDE 5 MG/1
5 TABLET ORAL
Refills: 0 | Status: DISCONTINUED | OUTPATIENT
Start: 2022-10-18 | End: 2022-10-19

## 2022-10-18 RX ORDER — PRAMOXINE HYDROCHLORIDE 150 MG/15G
1 AEROSOL, FOAM RECTAL EVERY 4 HOURS
Refills: 0 | Status: DISCONTINUED | OUTPATIENT
Start: 2022-10-18 | End: 2022-10-19

## 2022-10-18 RX ORDER — DIPHENHYDRAMINE HCL 50 MG
25 CAPSULE ORAL EVERY 6 HOURS
Refills: 0 | Status: DISCONTINUED | OUTPATIENT
Start: 2022-10-18 | End: 2022-10-19

## 2022-10-18 RX ORDER — BENZOCAINE 10 %
1 GEL (GRAM) MUCOUS MEMBRANE EVERY 6 HOURS
Refills: 0 | Status: DISCONTINUED | OUTPATIENT
Start: 2022-10-18 | End: 2022-10-19

## 2022-10-18 RX ORDER — DIBUCAINE 1 %
1 OINTMENT (GRAM) RECTAL EVERY 6 HOURS
Refills: 0 | Status: DISCONTINUED | OUTPATIENT
Start: 2022-10-18 | End: 2022-10-19

## 2022-10-18 RX ORDER — SIMETHICONE 80 MG/1
80 TABLET, CHEWABLE ORAL EVERY 4 HOURS
Refills: 0 | Status: DISCONTINUED | OUTPATIENT
Start: 2022-10-18 | End: 2022-10-19

## 2022-10-18 RX ORDER — KETOROLAC TROMETHAMINE 30 MG/ML
30 SYRINGE (ML) INJECTION ONCE
Refills: 0 | Status: DISCONTINUED | OUTPATIENT
Start: 2022-10-18 | End: 2022-10-18

## 2022-10-18 RX ORDER — ACETAMINOPHEN 500 MG
975 TABLET ORAL
Refills: 0 | Status: DISCONTINUED | OUTPATIENT
Start: 2022-10-18 | End: 2022-10-19

## 2022-10-18 RX ORDER — MAGNESIUM HYDROXIDE 400 MG/1
30 TABLET, CHEWABLE ORAL
Refills: 0 | Status: DISCONTINUED | OUTPATIENT
Start: 2022-10-18 | End: 2022-10-19

## 2022-10-18 RX ORDER — AER TRAVELER 0.5 G/1
1 SOLUTION RECTAL; TOPICAL EVERY 4 HOURS
Refills: 0 | Status: DISCONTINUED | OUTPATIENT
Start: 2022-10-18 | End: 2022-10-19

## 2022-10-18 RX ORDER — OXYTOCIN 10 UNIT/ML
333.33 VIAL (ML) INJECTION
Qty: 20 | Refills: 0 | Status: DISCONTINUED | OUTPATIENT
Start: 2022-10-18 | End: 2022-10-19

## 2022-10-18 RX ADMIN — Medication 2 MILLIUNIT(S)/MIN: at 06:07

## 2022-10-18 RX ADMIN — Medication 600 MILLIGRAM(S): at 23:34

## 2022-10-18 RX ADMIN — Medication 200 GRAM(S): at 01:08

## 2022-10-18 RX ADMIN — Medication 975 MILLIGRAM(S): at 15:06

## 2022-10-18 RX ADMIN — Medication 1000 MILLIUNIT(S)/MIN: at 11:30

## 2022-10-18 RX ADMIN — SODIUM CHLORIDE 3 MILLILITER(S): 9 INJECTION INTRAMUSCULAR; INTRAVENOUS; SUBCUTANEOUS at 14:10

## 2022-10-18 RX ADMIN — Medication 30 MILLILITER(S): at 01:13

## 2022-10-18 RX ADMIN — Medication 600 MILLIGRAM(S): at 17:25

## 2022-10-18 RX ADMIN — Medication 30 MILLIGRAM(S): at 13:04

## 2022-10-18 RX ADMIN — SODIUM CHLORIDE 125 MILLILITER(S): 9 INJECTION, SOLUTION INTRAVENOUS at 05:57

## 2022-10-18 RX ADMIN — Medication 30 MILLIGRAM(S): at 12:41

## 2022-10-18 RX ADMIN — Medication 108 GRAM(S): at 05:01

## 2022-10-18 RX ADMIN — Medication 108 GRAM(S): at 09:59

## 2022-10-18 NOTE — DISCHARGE NOTE OB - MEDICATION SUMMARY - MEDICATIONS TO STOP TAKING
I will STOP taking the medications listed below when I get home from the hospital:    prenatal vitamins  -- 1 tab(s) by mouth once a day    docusate sodium 100 mg oral tablet  -- 1 tab(s) by mouth once a day  -- Medication should be taken with plenty of water.    simethicone 80 mg oral tablet, chewable  -- 1 tab(s) by mouth every 4 hours, As needed, Gas    benzocaine 20% topical spray  -- 1 spray(s) on skin every 6 hours, As needed, for Perineal discomfort    ibuprofen 600 mg oral tablet  -- 1 tab(s) by mouth every 6 hours   -- Do not take this drug if you are pregnant.  It is very important that you take or use this exactly as directed.  Do not skip doses or discontinue unless directed by your doctor.  May cause drowsiness or dizziness.  Obtain medical advice before taking any non-prescription drugs as some may affect the action of this medication.  Take with food or milk.

## 2022-10-18 NOTE — OB PROVIDER DELIVERY SUMMARY - NSSELHIDDEN_OBGYN_ALL_OB_FT
[NS_DeliveryAttending1_OBGYN_ALL_OB_FT:[id number]],[NS_DeliveryAssist1_OBGYN_ALL_OB_FT:[id number]]

## 2022-10-18 NOTE — OB PROVIDER LABOR PROGRESS NOTE - NS_SUBJECTIVE/OBJECTIVE_OBGYN_ALL_OB_FT
Patient is in labor at 39w6d. Called to bedside for feeling more pressure. Patient is IOL secondary to NRFHT at 39w6d. Called to bedside for feeling more pressure.

## 2022-10-18 NOTE — DISCHARGE NOTE OB - MEDICATION SUMMARY - MEDICATIONS TO TAKE
I will START or STAY ON the medications listed below when I get home from the hospital:    ibuprofen 600 mg oral tablet  -- 1 tab(s) by mouth every 6 hours, As Needed -for mild pain   -- Do not take this drug if you are pregnant.  It is very important that you take or use this exactly as directed.  Do not skip doses or discontinue unless directed by your doctor.  May cause drowsiness or dizziness.  Obtain medical advice before taking any non-prescription drugs as some may affect the action of this medication.  Take with food or milk.    -- Indication: For pain    Tylenol 325 mg oral tablet  -- 2 tab(s) by mouth every 6 hours, As Needed -for moderate pain   -- This product contains acetaminophen.  Do not use  with any other product containing acetaminophen to prevent possible liver damage.    -- Indication: For pain   I will START or STAY ON the medications listed below when I get home from the hospital:    ibuprofen 600 mg oral tablet  -- 1 tab(s) by mouth every 6 hours, As Needed -for mild pain   -- Do not take this drug if you are pregnant.  It is very important that you take or use this exactly as directed.  Do not skip doses or discontinue unless directed by your doctor.  May cause drowsiness or dizziness.  Obtain medical advice before taking any non-prescription drugs as some may affect the action of this medication.  Take with food or milk.    -- Indication: For pain    Tylenol 325 mg oral tablet  -- 2 tab(s) by mouth every 6 hours, As Needed -for moderate pain   -- This product contains acetaminophen.  Do not use  with any other product containing acetaminophen to prevent possible liver damage.    -- Indication: For pain    ferrous sulfate 325 mg (65 mg elemental iron) oral tablet  -- 1 tab(s) by mouth once a day   -- Check with your doctor before becoming pregnant.  Do not chew, break, or crush.  May discolor urine or feces.    -- Indication: For anemia    MiraLax oral powder for reconstitution  -- 17 gram(s) by mouth once a day   -- Dilute this medication with liquid before administration.  It is very important that you take or use this exactly as directed.  Do not skip doses or discontinue unless directed by your doctor.    -- Indication: For constipation

## 2022-10-18 NOTE — OB PROVIDER LABOR PROGRESS NOTE - NS_STATION_OBGYN_ALL_OB_NU
CHEST 2 VIEWS ROUTINE



CLINICAL HISTORY: Preoperative chest    



COMPARISON STUDY:  No previous studies for comparison.



FINDINGS: The heart is at the upper limits of normal in size. There is no

failure. There is no focal pulmonary consolidation. There are no pleural

effusions. There are minor linear basilar atelectatic changes. Degenerative

changes are present within the spine.[ 



IMPRESSION: No active disease in the chest.







Electronically signed by:  Raciel Perez M.D.

2/20/2018 1:34 PM



Dictated Date/Time:  2/20/2018 1:33 PM
TWO VIEWS LEFT KNEE



CLINICAL HISTORY:  Postoperative examination.



FINDINGS: AP and crosstable lateral portable views of the left knee are

obtained. A left knee arthroplasty is in near anatomic alignment. There has been

undersurface remodeling of the patella. No acute fracture is seen. There are

expected postoperative changes around the knee including skin clips, a surgical

drain, soft tissue edema, and subcutaneous gas.



IMPRESSION: Expected postoperative changes status post left knee arthroplasty.

No acute fracture is seen.







Electronically signed by:  Negrito Mario M.D.

3/16/2018 11:07 AM



Dictated Date/Time:  3/16/2018 11:07 AM
-1
-2
0
-2

## 2022-10-18 NOTE — OB NEONATOLOGY/PEDIATRICIAN DELIVERY SUMMARY - NSPEDSNEONOTESA_OBGYN_ALL_OB_FT
Baby Girl Giovanni born via  at 39.6 to a 35yo  B+, Hep B neg, HIV NR, RPR NR, Rubella equiv, GBS positive mother. No significant maternal hx or prenatal complications. AROM 6 hours prior to delivery, light mec stained amniotic fluid. Nuchal cord x1 noted at delivery. Baby emerged vigorous and crying. Delayed cord clamping 30s. Warmed, dried, suctioned, stimulated. Apgar 9/9. Admit to . EOS 0.10.

## 2022-10-18 NOTE — OB PROVIDER LABOR PROGRESS NOTE - NS_OBIHIFHRDETAILS_OBGYN_ALL_OB_FT
140bpm, moderate variability, -accels, +early decels, +variable decels
130bpm - cat 1
FHR: baseline 130, mod variability, +accels, -decels
FHR: baseline 135, mod variability, +accels, -decels

## 2022-10-18 NOTE — OB PROVIDER LABOR PROGRESS NOTE - ASSESSMENT
A/P:    - cat I tracing  - will begin pushing
AROM light mec  To start pitocin
Plan  - patient desires epidural  - will continue expectant management
Plan  - will continue Pitocin

## 2022-10-18 NOTE — DISCHARGE NOTE OB - CARE PROVIDER_API CALL
Gpoal Lebron)  Obstetrics and Gynecology  [address], [address]  [place] [address]  Phone: [phone]  Fax: [phone]  Follow Up Time: 2 weeks

## 2022-10-18 NOTE — OB RN DELIVERY SUMMARY - NS_SEPSISRSKCALC_OBGYN_ALL_OB_FT
EOS calculated successfully. EOS Risk Factor: 0.5/1000 live births ([facility] national incidence); GA=39w6d; Temp=99; ROM=5.933; GBS='Positive'; Antibiotics='GBS specific antibiotics > 2 hrs prior to birth'

## 2022-10-18 NOTE — OB RN DELIVERY SUMMARY - NSSELHIDDEN_OBGYN_ALL_OB_FT
[NS_DeliveryAttending1_OBGYN_ALL_OB_FT:[id number]],[NS_DeliveryAssist1_OBGYN_ALL_OB_FT:[id number]],[NS_DeliveryRN_OBGYN_ALL_OB_FT:[id number]],[NS_CirculateRN2_OBGYN_ALL_OB_FT:[id number]]

## 2022-10-18 NOTE — DISCHARGE NOTE OB - HOSPITAL COURSE
s/p uncomplicated  on 10/18. Patient transferred to post partum unit, uncomplicated hospital course. At the time of discharge patient was tolerating regular diet PO, ambulating, voiding, and demonstrating bowel function. Pain well controlled with pain medications PRN.

## 2022-10-18 NOTE — OB PROVIDER DELIVERY SUMMARY - NSPROVIDERDELIVERYNOTE_OBGYN_ALL_OB_FT
at 11:29 of a live 3170 g male infant with Apgars 9/9. Delivered JUSTO, loose nuchal cord present and easily reducible, clear fluid. Infant's head delivered with maternal expulsive efforts. Shoulders delivered without difficulty followed by the rest of the body. Nose and mouth were bulb suctioned. Cord clamped and cut after delay. Samples obtained. Baby handed to patient. Placenta delivered spontaneously, intact at 11:30. Pitocin started. Excellent hemostasis achieved. Cervix, perineum and vagina were inspected and no lacerations were noted.  cc. Pt tolerated procedure well, in stable condition, recovering in LDR. Infant in LDR. Instrument/sponge count correct x 2 and confirmed by nurse.  at 11:29 of a live 3170 g female infant with Apgars 9/9. Delivered JUSTO, loose nuchal cord present and easily reducible, clear fluid. Infant's head delivered with maternal expulsive efforts. Shoulders delivered without difficulty followed by the rest of the body. Nose and mouth were bulb suctioned. Cord clamped and cut after delay. Baby handed to patient. Cord gas samples obtained. Placenta delivered spontaneously, intact at 11:30. Pitocin started. Excellent hemostasis achieved. Cervix, perineum and vagina were inspected and no lacerations were noted.  cc. Pt tolerated procedure well, in stable condition, recovering in LDR. Infant in LDR. Instrument/sponge count correct x 2 and confirmed by nurse.

## 2022-10-18 NOTE — DISCHARGE NOTE OB - PATIENT PORTAL LINK FT
You can access the FollowMyHealth Patient Portal offered by [facility] by registering at the following website: [email or web address]. By joining [employer]’s FollowMyHealth portal, you will also be able to view your health information using other applications (apps) compatible with our system.

## 2022-10-19 ENCOUNTER — NON-APPOINTMENT (OUTPATIENT)
Age: 32
End: 2022-10-19

## 2022-10-19 VITALS
RESPIRATION RATE: 18 BRPM | OXYGEN SATURATION: 98 % | SYSTOLIC BLOOD PRESSURE: 110 MMHG | DIASTOLIC BLOOD PRESSURE: 75 MMHG | HEART RATE: 59 BPM | TEMPERATURE: 98 F

## 2022-10-19 DIAGNOSIS — Z3A.39 39 WEEKS GESTATION OF PREGNANCY: ICD-10-CM

## 2022-10-19 DIAGNOSIS — Z34.90 ENCOUNTER FOR SUPERVISION OF NORMAL PREGNANCY, UNSPECIFIED, UNSPECIFIED TRIMESTER: ICD-10-CM

## 2022-10-19 DIAGNOSIS — B95.1 STREPTOCOCCUS, GROUP B, AS THE CAUSE OF DISEASES CLASSIFIED ELSEWHERE: ICD-10-CM

## 2022-10-19 LAB
HCT VFR BLD CALC: 26.2 % — LOW (ref 34.5–45)
HGB BLD-MCNC: 7.8 G/DL — LOW (ref 11.5–15.5)

## 2022-10-19 PROCEDURE — 86901 BLOOD TYPING SEROLOGIC RH(D): CPT

## 2022-10-19 PROCEDURE — 85014 HEMATOCRIT: CPT

## 2022-10-19 PROCEDURE — 86769 SARS-COV-2 COVID-19 ANTIBODY: CPT

## 2022-10-19 PROCEDURE — 86850 RBC ANTIBODY SCREEN: CPT

## 2022-10-19 PROCEDURE — 85018 HEMOGLOBIN: CPT

## 2022-10-19 PROCEDURE — 36415 COLL VENOUS BLD VENIPUNCTURE: CPT

## 2022-10-19 PROCEDURE — 86900 BLOOD TYPING SEROLOGIC ABO: CPT

## 2022-10-19 PROCEDURE — U0003: CPT

## 2022-10-19 PROCEDURE — 85025 COMPLETE CBC W/AUTO DIFF WBC: CPT

## 2022-10-19 PROCEDURE — 86780 TREPONEMA PALLIDUM: CPT

## 2022-10-19 PROCEDURE — U0005: CPT

## 2022-10-19 RX ORDER — FERROUS SULFATE 325(65) MG
1 TABLET ORAL
Qty: 30 | Refills: 0
Start: 2022-10-19 | End: 2022-11-17

## 2022-10-19 RX ORDER — FERROUS SULFATE 325(65) MG
325 TABLET ORAL DAILY
Refills: 0 | Status: DISCONTINUED | OUTPATIENT
Start: 2022-10-19 | End: 2022-10-19

## 2022-10-19 RX ORDER — ACETAMINOPHEN 500 MG
2 TABLET ORAL
Qty: 56 | Refills: 0
Start: 2022-10-19 | End: 2022-10-25

## 2022-10-19 RX ORDER — IBUPROFEN 200 MG
1 TABLET ORAL
Qty: 28 | Refills: 0
Start: 2022-10-19 | End: 2022-10-25

## 2022-10-19 RX ORDER — POLYETHYLENE GLYCOL 3350 17 G/17G
17 POWDER, FOR SOLUTION ORAL DAILY
Refills: 0 | Status: DISCONTINUED | OUTPATIENT
Start: 2022-10-19 | End: 2022-10-19

## 2022-10-19 RX ORDER — POLYETHYLENE GLYCOL 3350 17 G/17G
17 POWDER, FOR SOLUTION ORAL
Qty: 119 | Refills: 0
Start: 2022-10-19 | End: 2022-10-25

## 2022-10-19 RX ADMIN — Medication 975 MILLIGRAM(S): at 14:42

## 2022-10-19 RX ADMIN — Medication 600 MILLIGRAM(S): at 11:38

## 2022-10-19 RX ADMIN — Medication 600 MILLIGRAM(S): at 12:31

## 2022-10-19 RX ADMIN — Medication 325 MILLIGRAM(S): at 11:37

## 2022-10-19 RX ADMIN — Medication 975 MILLIGRAM(S): at 10:30

## 2022-10-19 RX ADMIN — Medication 1 TABLET(S): at 11:38

## 2022-10-19 RX ADMIN — POLYETHYLENE GLYCOL 3350 17 GRAM(S): 17 POWDER, FOR SOLUTION ORAL at 11:37

## 2022-10-19 RX ADMIN — Medication 600 MILLIGRAM(S): at 05:52

## 2022-10-19 RX ADMIN — Medication 975 MILLIGRAM(S): at 09:33

## 2022-10-19 NOTE — PROGRESS NOTE ADULT - ASSESSMENT
A/P:OLINDA LARSON is a 32y   s/p  @ 39w6d, IOL for non reassuring FHT  PPD1    #Routine post partum care  - Stable, doing well postpartum  - Hgb 9.2 > pending, PO iron ordered  - Pain: well controlled on PO pain meds  - GI: regular diet, normal bowel function   - : voiding , lochia appropriate  - DVT ppx: SCDs, ambulation encouraged  - Healthy baby girl  - Dispo: for discharge today pending H/H

## 2022-10-19 NOTE — PROGRESS NOTE ADULT - SUBJECTIVE AND OBJECTIVE BOX
OLINDA LARSON is a 32y   s/p  @ 39w6d, IOL for non reassuring FHT  PPD1    SUBJECTIVE:  No acute events overnight.  Patient has no complaints.  Pain is well controlled.  +flatus, + voiding.  Ambulating and tolerating PO.  Appropriate lochia.  Denies fever, chills, nausea, and vomiting.  She denies lightheadedness, dizziness, HA, blurry vision, palpitations, chest pain and SOB.     OBJECTIVE:  Physical exam:  General: AOx3, NAD.  Heart: RRR  Lungs: CTAB  Abdomen: Soft, appropriately tender to palpitation, fundus firm.  Vaginal: expectant lochia  Ext: No DVT signs, warm extremities.    Vital Signs Last 24 Hrs  T(C): 36.5 (18 Oct 2022 15:57), Max: 36.7 (18 Oct 2022 11:46)  T(F): 97.7 (18 Oct 2022 15:57), Max: 98.1 (18 Oct 2022 13:02)  HR: 66 (18 Oct 2022 15:57) (60 - 94)  BP: 102/64 (18 Oct 2022 15:57) (97/63 - 133/62)  RR: 18 (18 Oct 2022 15:57) (16 - 18)  SpO2: 98% (18 Oct 2022 15:57) (92% - 100%)      LABS:                        9.2    10.09 )-----------( 306      ( 17 Oct 2022 22:00 )             30.4

## 2022-12-09 NOTE — OB RN DELIVERY SUMMARY - BABY A: ELECT TRANSPOND NUMBER, DELIVERY
Ivermectin Counseling:  Patient instructed to take medication on an empty stomach with a full glass of water.  Patient informed of potential adverse effects including but not limited to nausea, diarrhea, dizziness, itching, and swelling of the extremities or lymph nodes.  The patient verbalized understanding of the proper use and possible adverse effects of ivermectin.  All of the patient's questions and concerns were addressed. 8

## 2023-06-30 NOTE — OB RN PATIENT PROFILE - PRO HIV INFANT
[Respiratory Effort] : normal respiratory effort [Normal Rate and Rhythm] : normal rate and rhythm [Calm] : calm [de-identified] : Soft, nontender, nondistended.  No mass or hernias appreciated.  Lower midline incision noted. [de-identified] : Well-appearing, in no distress [de-identified] : Normocephalic, atraumatic [de-identified] : Moves extremities without difficulty [de-identified] : Warm and dry [de-identified] : Alert and oriented x3 negative

## 2023-09-07 NOTE — OB RN TRIAGE NOTE - NSLDARRIVAL_OBGYN_ALL_OB_DIFF_HHMM
Bleeding that does not stop/Fever greater than (need to indicate Fahrenheit or Celsius) 1 Hour(s) 57 Minute(s)

## 2024-05-18 NOTE — OB RN TRIAGE NOTE - NSICDXPASTMEDICALHX_GEN_ALL_CORE_FT
PAST MEDICAL HISTORY:  Anemia      (normal spontaneous vaginal delivery) 2006, 2008, 2010, 2019    Spontaneous  x1 2017     Stable.

## 2024-11-25 NOTE — PROGRESS NOTE ADULT - ASSESSMENT
A/P:  Patient is a 28yo  now PPD#1 s/p spontaneous vaginal delivery  -Stable  -Voiding, tolerating PO, bowel function nml   -Advance care as tolerated   -Continue routine postpartum/postoperative care and education.  -Pt encouraged to increase ambulation and PO intake  -Desires circumcision.  -D/C day 2 if meeting all expected milestones Detail Level: Zone

## 2024-12-24 PROBLEM — F10.90 ALCOHOL USE: Status: INACTIVE | Noted: 2022-03-23

## 2025-01-31 NOTE — OB RN TRIAGE NOTE - NS_EDDCALCULATED_OBGYN_ALL_OB
What Type Of Note Output Would You Prefer (Optional)?: Standard Output Hpi Title: Evaluation of Skin Lesions How Severe Are Your Spot(S)?: mild LMP

## 2025-04-03 NOTE — DISCHARGE NOTE OB - HOSPITAL COURSE
Pt is a 35M with MHx obesity (Class III, BMI 69), pAfib (no AC), HTN, BEA (dz'ed 2019, AHI 34 non-compliant on CPAP set at 10), and history of b/l papilledema attributed to pseudotumor cerebri initially presenting as a transfer [facility] on 2/26 for acute hypoxemic respiratory failure s/p ETT intubation/MV with progression to refractory ARDS s/p initiation of vv-ECMO support (2/26-3/7) with failure to wean from ventilator s/p tracheostomy placement, further found to have RV failure s/p aggressive diuresis and PNA followed by severe sepsis 2/2 panniculitis c/b candida albicans fungemia now transferred to RCU for further medical management.     Pt now awake and alert, spontaneously communicative and at mental status baseline. Continues to have severe functional debility likely 2/2 prolonged critical illness polyneuropathy but is progressing well. Now able to transfer via Nette to chair daily and is independent of UE and fine motor function. Off all sedation. Weaning of catapres initiated 2/25 (decreased to 0.2mg TID.) Weaning pain control, pt remains dependent on Dilaudid prn for LE neuropathic pain. PO regimen added and gabapentin increased without benefit, increased again today to 600mg qhs. Pain mgmt team consulted, recs appreciated. Podiatry consulted, possible worsening of right 5th digit that may require podiatric sx. Will CTM for now. No evidence of active infectious process in feet.    Pt with acute combined hypoxemic and hypercapnic respiratory failure with failure to wean from ventilator s/p tracheostomy placement. Pt was tolerating TC QD, PSV (18/10) qhs. Airway clearance therapy in place. Wean O2 supplementation for goal O2 saturation 90-95%. Aspiration precautions and oral hygiene in place. Decannulated at bedside 3/28, continues to use Bilevel NIV qhs. Serial ABGs wnl.     Pt further presenting with acute on chronic hypercapnia and will require Bilevel after discharge for chronic use 2/2 chronic hypercapnic respiratory failure from hypoventilation and obesity hypoventilation syndrome (BMI 69.) Pt has exhibited an inability to maintain appropriate ventilation with persistent hypercapnia without Bilevel support qhs. Pt has high CO2 levels (PaCO2 on ABG >52.) PaCO2 done immediately upon awakening while breathing his prescribed FiO2 shows the patient's PaCO2 worsened >7mmHg compared to initial ABG. Patient's OHS requires ventilatory support. Due to the patient's severe disease state and severe dyspnea, Bilevel is necessary. Due to the patient's decreased ventilatory drive and high CO2 levels, without Bilevel support patient will experience rapid clinical deterioration, which will lead to serious medical harm. Acute on chronic hypercapnic respiratory failure due to patient (BMI 69) obesity, which is causing restriction of the thoracic cage not allowing for proper gas exchange.     Pt initially p/w RV failure 2/2 pulmHTN now s/p aggressive diuresis with improvement. Now transitioned to PO diuretics with goal to maintain euvolemia. Acetazolamide added today. AFib well controlled conservatively, will continue to monitor on telemetry. TTE 2/25 with new RVE, 3/11 with RVSD. DVT noted on repeat duplex of the right IJ and bilateral peroneal veins. Will c/w full A/C on Eliquis for DVT and AFib.     Pt with oropharyngeal dysphagia s/p PEG placement (3/7) now transitioned to PO diet (3/20) via FEEST. Will continue to monitor on regular PO diet. Bowel regimen in place prn. PPI ppx. Transaminitis improving.     Pt further found to have severe sepsis 2/ candida albicans fungemia possibly from panniculitis (3/15, cleared 3/18) now on caspofungin with plan to complete 14 day course from clearance. TTE (-) endocarditis. Wound care team following. ID recs appreciated. Further found to have worsening sacral ulceration with induration, CT A/P and MRI spine performed without evidence of underlying tract or drainable fluid collection. Now with acute onset fevers, although without new s/s or complaints and with unclear source. Cx resent, empiric abx initiated with improvement. Will CTM closely and f/u repeat cx. Plan for completion of 7 day course.     Dispo pending medical optimization. Pt full code. DVT ppx in place. Palliative consulted, recs appreciated. Will continue to update family and discuss plan of care throughout hospitalization. Dispo planning to acute rehab initiated. Pt is a 35M with MHx obesity (Class III, BMI 69), pAfib (no AC), HTN, BEA (dz'ed 2019, AHI 34 non-compliant on CPAP set at 10), and history of b/l papilledema attributed to pseudotumor cerebri initially presenting as a transfer [facility] on 2/26 for acute hypoxemic respiratory failure s/p ETT intubation/MV with progression to refractory ARDS s/p initiation of vv-ECMO support (2/26-3/7) with failure to wean from ventilator s/p tracheostomy placement, further found to have RV failure s/p aggressive diuresis and PNA followed by severe sepsis 2/2 panniculitis c/b candida albicans fungemia now transferred to RCU for further medical management.     Pt now awake and alert, spontaneously communicative and at mental status baseline. Continues to have severe functional debility likely 2/2 prolonged critical illness polyneuropathy but is progressing well. Now able to transfer via Nette to chair daily and is independent of UE and fine motor function. Off all sedation. Weaning of catapres initiated 2/25 (decreased to 0.2mg TID.) Weaning pain control, pt remains dependent on Dilaudid prn for LE neuropathic pain. PO regimen added and gabapentin increased without benefit, increased again today to 600mg BID and 800mg qhs. Pain mgmt team consulted, recs appreciated. Podiatry consulted, possible worsening of right 5th digit that may require podiatric sx. Will CTM for now. No evidence of active infectious process in feet.    Pt with acute combined hypoxemic and hypercapnic respiratory failure with failure to wean from ventilator s/p tracheostomy placement. Pt was tolerating TC QD, PSV (18/10) qhs. Airway clearance therapy in place. Wean O2 supplementation for goal O2 saturation 90-95%. Aspiration precautions and oral hygiene in place. Decannulated at bedside 3/28, continues to use Bilevel NIV qhs. Serial ABGs wnl.     Pt further presenting with acute on chronic hypercapnia and will require Bilevel after discharge for chronic use 2/2 chronic hypercapnic respiratory failure from hypoventilation and obesity hypoventilation syndrome (BMI 69.) Pt has exhibited an inability to maintain appropriate ventilation with persistent hypercapnia without Bilevel support qhs. Pt has high CO2 levels (PaCO2 on ABG >52.) PaCO2 done immediately upon awakening while breathing his prescribed FiO2 shows the patient's PaCO2 worsened >7mmHg compared to initial ABG. Patient's OHS requires ventilatory support. Due to the patient's severe disease state and severe dyspnea, Bilevel is necessary. Due to the patient's decreased ventilatory drive and high CO2 levels, without Bilevel support patient will experience rapid clinical deterioration, which will lead to serious medical harm. Acute on chronic hypercapnic respiratory failure due to patient (BMI 69) obesity, which is causing restriction of the thoracic cage not allowing for proper gas exchange.     Pt initially p/w RV failure 2/2 pulmHTN now s/p aggressive diuresis with improvement. Now transitioned to PO diuretics with goal to maintain euvolemia. Acetazolamide added today. AFib well controlled conservatively, will continue to monitor on telemetry. TTE 2/25 with new RVE, 3/11 with RVSD. DVT noted on repeat duplex of the right IJ and bilateral peroneal veins. Will c/w full A/C on Eliquis for DVT and AFib.     Pt with oropharyngeal dysphagia s/p PEG placement (3/7) now transitioned to PO diet (3/20) via FEEST. Will continue to monitor on regular PO diet. Bowel regimen in place prn. PPI ppx. Transaminitis improving.     Pt further found to have severe sepsis 2/ candida albicans fungemia possibly from panniculitis (3/15, cleared 3/18) now on caspofungin with plan to complete 14 day course from clearance. TTE (-) endocarditis. Wound care team following. ID recs appreciated. Further found to have worsening sacral ulceration with induration, CT A/P and MRI spine performed without evidence of underlying tract or drainable fluid collection. Now with acute onset fevers, although without new s/s or complaints and with unclear source. Cx resent, empiric abx initiated with improvement. Will CTM closely and f/u repeat cx. Today, wound care team evaluated and is concerned for worsening tunnelling of sacral wound. Will perform pan-scan with special focus on sacrum to r/o new osteomyelitis.     Dispo pending medical optimization. Pt full code. DVT ppx in place. Palliative consulted, recs appreciated. Will continue to update family and discuss plan of care throughout hospitalization. Dispo planning to acute rehab initiated. Patient underwent a normal spontaneous vaginal delivery at 39w4d. Post-partum course was uncomplicated. Pain is well controlled with PRN medication. She has no difficulty with ambulation, voiding, or PO intake. Lab values and vital signs are within normal limits prior to discharge.

## 2025-04-28 ENCOUNTER — APPOINTMENT (OUTPATIENT)
Dept: OBGYN | Facility: CLINIC | Age: 35
End: 2025-04-28
Payer: COMMERCIAL

## 2025-04-28 ENCOUNTER — NON-APPOINTMENT (OUTPATIENT)
Age: 35
End: 2025-04-28

## 2025-04-28 VITALS
WEIGHT: 129 LBS | HEIGHT: 61 IN | BODY MASS INDEX: 24.35 KG/M2 | SYSTOLIC BLOOD PRESSURE: 120 MMHG | DIASTOLIC BLOOD PRESSURE: 80 MMHG

## 2025-04-28 DIAGNOSIS — N91.1 SECONDARY AMENORRHEA: ICD-10-CM

## 2025-04-28 LAB
HCG UR QL: POSITIVE
QUALITY CONTROL: YES

## 2025-04-28 PROCEDURE — 99395 PREV VISIT EST AGE 18-39: CPT

## 2025-04-28 PROCEDURE — 99459 PELVIC EXAMINATION: CPT

## 2025-04-28 PROCEDURE — 99214 OFFICE O/P EST MOD 30 MIN: CPT | Mod: 25

## 2025-04-28 PROCEDURE — 81025 URINE PREGNANCY TEST: CPT

## 2025-04-28 RX ORDER — CHLORHEXIDINE GLUCONATE 4 %
325 (65 FE) LIQUID (ML) TOPICAL 3 TIMES DAILY
Qty: 180 | Refills: 3 | Status: ACTIVE | COMMUNITY
Start: 2025-04-28 | End: 1900-01-01

## 2025-04-29 ENCOUNTER — ASOB RESULT (OUTPATIENT)
Age: 35
End: 2025-04-29

## 2025-04-29 ENCOUNTER — APPOINTMENT (OUTPATIENT)
Dept: ANTEPARTUM | Facility: CLINIC | Age: 35
End: 2025-04-29
Payer: COMMERCIAL

## 2025-04-29 LAB
C TRACH RRNA SPEC QL NAA+PROBE: NOT DETECTED
N GONORRHOEA RRNA SPEC QL NAA+PROBE: NOT DETECTED
SOURCE TP AMPLIFICATION: NORMAL

## 2025-04-29 PROCEDURE — 76801 OB US < 14 WKS SINGLE FETUS: CPT

## 2025-04-30 LAB — HPV HIGH+LOW RISK DNA PNL CVX: NOT DETECTED

## 2025-05-05 DIAGNOSIS — Z34.90 ENCOUNTER FOR SUPERVISION OF NORMAL PREGNANCY, UNSPECIFIED, UNSPECIFIED TRIMESTER: ICD-10-CM

## 2025-05-05 LAB — CYTOLOGY CVX/VAG DOC THIN PREP: NORMAL

## 2025-05-07 ENCOUNTER — APPOINTMENT (OUTPATIENT)
Dept: ANTEPARTUM | Facility: CLINIC | Age: 35
End: 2025-05-07
Payer: COMMERCIAL

## 2025-05-07 ENCOUNTER — ASOB RESULT (OUTPATIENT)
Age: 35
End: 2025-05-07

## 2025-05-07 PROCEDURE — 76817 TRANSVAGINAL US OBSTETRIC: CPT

## 2025-05-09 ENCOUNTER — NON-APPOINTMENT (OUTPATIENT)
Age: 35
End: 2025-05-09

## 2025-05-13 ENCOUNTER — LABORATORY RESULT (OUTPATIENT)
Age: 35
End: 2025-05-13

## 2025-05-14 ENCOUNTER — APPOINTMENT (OUTPATIENT)
Dept: OBGYN | Facility: CLINIC | Age: 35
End: 2025-05-14
Payer: COMMERCIAL

## 2025-05-14 VITALS
WEIGHT: 126.1 LBS | BODY MASS INDEX: 23.81 KG/M2 | HEIGHT: 61 IN | DIASTOLIC BLOOD PRESSURE: 70 MMHG | SYSTOLIC BLOOD PRESSURE: 100 MMHG

## 2025-05-14 DIAGNOSIS — Z3A.08 8 WEEKS GESTATION OF PREGNANCY: ICD-10-CM

## 2025-05-14 LAB
APPEARANCE: CLEAR
BILIRUBIN URINE: NEGATIVE
BLOOD URINE: ABNORMAL
COLOR: YELLOW
GLUCOSE QUALITATIVE U: NEGATIVE
KETONES URINE: NEGATIVE
LEUKOCYTE ESTERASE URINE: ABNORMAL
NITRITE URINE: NEGATIVE
PH URINE: 7
PROTEIN URINE: ABNORMAL
SPECIFIC GRAVITY URINE: 1.02
UROBILINOGEN URINE: 0.2 (ref 0.2–?)

## 2025-05-14 PROCEDURE — 36415 COLL VENOUS BLD VENIPUNCTURE: CPT

## 2025-05-14 PROCEDURE — 0502F SUBSEQUENT PRENATAL CARE: CPT

## 2025-05-15 LAB
ABORH: NORMAL
ALBUMIN SERPL ELPH-MCNC: 4 G/DL
ALP BLD-CCNC: 60 U/L
ALT SERPL-CCNC: 16 U/L
ANION GAP SERPL CALC-SCNC: 19 MMOL/L
ANTIBODY SCREEN: NORMAL
APPEARANCE: ABNORMAL
AST SERPL-CCNC: 19 U/L
BILIRUB SERPL-MCNC: 0.2 MG/DL
BILIRUBIN URINE: NEGATIVE
BLOOD URINE: NEGATIVE
BUN SERPL-MCNC: 8 MG/DL
CALCIUM SERPL-MCNC: 9.4 MG/DL
CHLORIDE SERPL-SCNC: 103 MMOL/L
CO2 SERPL-SCNC: 17 MMOL/L
COLOR: YELLOW
CREAT SERPL-MCNC: 0.59 MG/DL
EGFRCR SERPLBLD CKD-EPI 2021: 119 ML/MIN/1.73M2
ESTIMATED AVERAGE GLUCOSE: 111 MG/DL
GLUCOSE QUALITATIVE U: NEGATIVE MG/DL
GLUCOSE SERPL-MCNC: 50 MG/DL
HBA1C MFR BLD HPLC: 5.5 %
HBV SURFACE AG SER QL: NONREACTIVE
HCT VFR BLD CALC: 33.9 %
HCV AB SER QL: NONREACTIVE
HCV S/CO RATIO: 0.08 S/CO
HGB BLD-MCNC: 9.7 G/DL
KETONES URINE: ABNORMAL MG/DL
LEUKOCYTE ESTERASE URINE: ABNORMAL
MCHC RBC-ENTMCNC: 20.7 PG
MCHC RBC-ENTMCNC: 28.6 G/DL
MCV RBC AUTO: 72.4 FL
NITRITE URINE: NEGATIVE
PH URINE: 7.5
PLATELET # BLD AUTO: 386 K/UL
POTASSIUM SERPL-SCNC: 4.9 MMOL/L
PROT SERPL-MCNC: 6.6 G/DL
PROTEIN URINE: 30 MG/DL
RBC # BLD: 4.68 M/UL
RBC # FLD: 21.5 %
SODIUM SERPL-SCNC: 139 MMOL/L
SPECIFIC GRAVITY URINE: 1.03
TSH SERPL-ACNC: 0.48 UIU/ML
UROBILINOGEN URINE: 1 MG/DL
WBC # FLD AUTO: 7.51 K/UL

## 2025-05-16 LAB
B19V IGG SER QL IA: 0.12 INDEX
B19V IGG+IGM SER-IMP: NEGATIVE
B19V IGG+IGM SER-IMP: NORMAL
B19V IGM FLD-ACNC: 0.14 INDEX
B19V IGM SER-ACNC: NEGATIVE
HGB A MFR BLD: 67.5 %
HGB A2 MFR BLD: 3.1 %
HGB FRACT BLD-IMP: NORMAL
HGB OTHER MFR BLD ELPH: 29.4 %
HIV1+2 AB SPEC QL IA.RAPID: NONREACTIVE
LEAD BLD-MCNC: <1 UG/DL

## 2025-05-17 LAB
M TB IFN-G BLD-IMP: NEGATIVE
MEV IGG FLD QL IA: 55.1 AU/ML
MEV IGG+IGM SER-IMP: POSITIVE
MUV AB SER-ACNC: POSITIVE
MUV IGG SER QL IA: 15.5 AU/ML
QUANTIFERON TB PLUS MITOGEN MINUS NIL: 5.66 IU/ML
QUANTIFERON TB PLUS NIL: 0.04 IU/ML
QUANTIFERON TB PLUS TB1 MINUS NIL: 0 IU/ML
QUANTIFERON TB PLUS TB2 MINUS NIL: 0 IU/ML
RUBV IGG FLD-ACNC: 0.81 INDEX
RUBV IGG SER-IMP: NEGATIVE
T PALLIDUM AB SER QL IA: NEGATIVE
VZV AB TITR SER: POSITIVE
VZV IGG SER IF-ACNC: 4.72 S/CO

## 2025-05-19 ENCOUNTER — APPOINTMENT (OUTPATIENT)
Dept: MATERNAL FETAL MEDICINE | Facility: CLINIC | Age: 35
End: 2025-05-19
Payer: COMMERCIAL

## 2025-05-19 ENCOUNTER — ASOB RESULT (OUTPATIENT)
Age: 35
End: 2025-05-19

## 2025-05-19 PROCEDURE — 99202 OFFICE O/P NEW SF 15 MIN: CPT | Mod: 95

## 2025-05-20 ENCOUNTER — NON-APPOINTMENT (OUTPATIENT)
Age: 35
End: 2025-05-20

## 2025-05-20 ENCOUNTER — APPOINTMENT (OUTPATIENT)
Dept: ANTEPARTUM | Facility: CLINIC | Age: 35
End: 2025-05-20
Payer: COMMERCIAL

## 2025-05-20 PROCEDURE — 36415 COLL VENOUS BLD VENIPUNCTURE: CPT

## 2025-05-27 ENCOUNTER — NON-APPOINTMENT (OUTPATIENT)
Age: 35
End: 2025-05-27

## 2025-05-29 ENCOUNTER — NON-APPOINTMENT (OUTPATIENT)
Age: 35
End: 2025-05-29

## 2025-06-04 ENCOUNTER — APPOINTMENT (OUTPATIENT)
Dept: OBGYN | Facility: CLINIC | Age: 35
End: 2025-06-04
Payer: COMMERCIAL

## 2025-06-04 VITALS
DIASTOLIC BLOOD PRESSURE: 68 MMHG | BODY MASS INDEX: 23.66 KG/M2 | HEIGHT: 61 IN | SYSTOLIC BLOOD PRESSURE: 110 MMHG | WEIGHT: 125.3 LBS

## 2025-06-04 DIAGNOSIS — Z3A.12 12 WEEKS GESTATION OF PREGNANCY: ICD-10-CM

## 2025-06-04 LAB
APPEARANCE: CLEAR
BILIRUBIN URINE: NEGATIVE
BLOOD URINE: ABNORMAL
COLOR: YELLOW
GLUCOSE QUALITATIVE U: NEGATIVE
KETONES URINE: NEGATIVE
LEUKOCYTE ESTERASE URINE: NEGATIVE
NITRITE URINE: NEGATIVE
PH URINE: 5.5
PROTEIN URINE: NEGATIVE
SPECIFIC GRAVITY URINE: >=1.03
UROBILINOGEN URINE: 0.2 (ref 0.2–?)

## 2025-06-04 PROCEDURE — 0502F SUBSEQUENT PRENATAL CARE: CPT

## 2025-06-13 ENCOUNTER — ASOB RESULT (OUTPATIENT)
Age: 35
End: 2025-06-13

## 2025-06-13 ENCOUNTER — APPOINTMENT (OUTPATIENT)
Dept: ANTEPARTUM | Facility: CLINIC | Age: 35
End: 2025-06-13
Payer: COMMERCIAL

## 2025-06-13 PROCEDURE — 76813 OB US NUCHAL MEAS 1 GEST: CPT

## 2025-06-25 ENCOUNTER — APPOINTMENT (OUTPATIENT)
Dept: OBGYN | Facility: CLINIC | Age: 35
End: 2025-06-25
Payer: COMMERCIAL

## 2025-06-25 VITALS
DIASTOLIC BLOOD PRESSURE: 70 MMHG | HEIGHT: 61 IN | BODY MASS INDEX: 23.85 KG/M2 | SYSTOLIC BLOOD PRESSURE: 116 MMHG | WEIGHT: 126.31 LBS

## 2025-06-25 PROCEDURE — 0502F SUBSEQUENT PRENATAL CARE: CPT

## 2025-06-25 RX ORDER — ASPIRIN 81 MG/1
81 TABLET, DELAYED RELEASE ORAL DAILY
Qty: 2 | Refills: 2 | Status: ACTIVE | COMMUNITY
Start: 2025-06-25 | End: 1900-01-01

## 2025-06-25 RX ORDER — AMOXICILLIN 500 MG/1
500 CAPSULE ORAL 3 TIMES DAILY
Qty: 21 | Refills: 3 | Status: ACTIVE | COMMUNITY
Start: 2025-06-25 | End: 1900-01-01

## 2025-07-03 PROBLEM — Z34.92 SECOND TRIMESTER PREGNANCY: Status: ACTIVE | Noted: 2025-07-03

## 2025-07-03 PROBLEM — Z3A.08 8 WEEKS GESTATION OF PREGNANCY: Status: RESOLVED | Noted: 2025-05-14 | Resolved: 2025-07-03

## 2025-07-03 PROBLEM — Z3A.15 15 WEEKS GESTATION OF PREGNANCY: Status: RESOLVED | Noted: 2025-06-25 | Resolved: 2025-07-03

## 2025-07-03 PROBLEM — Z3A.12 12 WEEKS GESTATION OF PREGNANCY: Status: RESOLVED | Noted: 2025-06-04 | Resolved: 2025-07-03

## 2025-07-03 PROBLEM — Z34.90 PREGNANCY AT EARLY STAGE: Status: RESOLVED | Noted: 2025-05-05 | Resolved: 2025-07-03

## 2025-07-17 ENCOUNTER — APPOINTMENT (OUTPATIENT)
Dept: OBGYN | Facility: CLINIC | Age: 35
End: 2025-07-17
Payer: COMMERCIAL

## 2025-07-17 VITALS
BODY MASS INDEX: 23.98 KG/M2 | WEIGHT: 127 LBS | SYSTOLIC BLOOD PRESSURE: 110 MMHG | DIASTOLIC BLOOD PRESSURE: 66 MMHG | HEIGHT: 61 IN

## 2025-07-17 PROBLEM — Z3A.17 17 WEEKS GESTATION OF PREGNANCY: Status: ACTIVE | Noted: 2025-07-17

## 2025-07-17 LAB
APPEARANCE: CLEAR
BILIRUBIN URINE: NEGATIVE
BLOOD URINE: ABNORMAL
COLOR: YELLOW
GLUCOSE QUALITATIVE U: NEGATIVE
KETONES URINE: ABNORMAL
LEUKOCYTE ESTERASE URINE: NEGATIVE
NITRITE URINE: NEGATIVE
PH URINE: 6
PROTEIN URINE: NEGATIVE
SPECIFIC GRAVITY URINE: 1.02
UROBILINOGEN URINE: 0.2 (ref 0.2–?)

## 2025-07-17 PROCEDURE — 0502F SUBSEQUENT PRENATAL CARE: CPT

## 2025-08-01 ENCOUNTER — APPOINTMENT (OUTPATIENT)
Dept: ANTEPARTUM | Facility: CLINIC | Age: 35
End: 2025-08-01
Payer: COMMERCIAL

## 2025-08-01 ENCOUNTER — ASOB RESULT (OUTPATIENT)
Age: 35
End: 2025-08-01

## 2025-08-01 PROCEDURE — 76811 OB US DETAILED SNGL FETUS: CPT

## 2025-08-01 PROCEDURE — 76817 TRANSVAGINAL US OBSTETRIC: CPT

## 2025-08-04 ENCOUNTER — NON-APPOINTMENT (OUTPATIENT)
Age: 35
End: 2025-08-04

## 2025-08-06 ENCOUNTER — APPOINTMENT (OUTPATIENT)
Dept: OBGYN | Facility: CLINIC | Age: 35
End: 2025-08-06
Payer: COMMERCIAL

## 2025-08-06 VITALS
HEIGHT: 61 IN | DIASTOLIC BLOOD PRESSURE: 68 MMHG | SYSTOLIC BLOOD PRESSURE: 110 MMHG | WEIGHT: 129.7 LBS | BODY MASS INDEX: 24.49 KG/M2

## 2025-08-06 DIAGNOSIS — Z3A.21 21 WEEKS GESTATION OF PREGNANCY: ICD-10-CM

## 2025-08-06 LAB
APPEARANCE: CLEAR
BILIRUBIN URINE: NEGATIVE
BLOOD URINE: NEGATIVE
COLOR: YELLOW
GLUCOSE QUALITATIVE U: NEGATIVE
KETONES URINE: ABNORMAL
LEUKOCYTE ESTERASE URINE: NEGATIVE
NITRITE URINE: NEGATIVE
PH URINE: 6
PROTEIN URINE: NEGATIVE
SPECIFIC GRAVITY URINE: 1.02
UROBILINOGEN URINE: 0.2 (ref 0.2–?)

## 2025-08-06 PROCEDURE — 0502F SUBSEQUENT PRENATAL CARE: CPT

## 2025-08-06 RX ORDER — FERROUS FUMARATE/ASCORBIC ACID 65MG-25 MG
65-25 TABLET, EXTENDED RELEASE ORAL
Qty: 60 | Refills: 6 | Status: ACTIVE | COMMUNITY
Start: 2025-08-06 | End: 1900-01-01

## 2025-08-19 ENCOUNTER — OUTPATIENT (OUTPATIENT)
Dept: INPATIENT UNIT | Facility: HOSPITAL | Age: 35
LOS: 1 days | End: 2025-08-19
Payer: COMMERCIAL

## 2025-08-19 VITALS
RESPIRATION RATE: 18 BRPM | HEART RATE: 79 BPM | SYSTOLIC BLOOD PRESSURE: 105 MMHG | TEMPERATURE: 98 F | DIASTOLIC BLOOD PRESSURE: 68 MMHG

## 2025-08-19 DIAGNOSIS — O26.899 OTHER SPECIFIED PREGNANCY RELATED CONDITIONS, UNSPECIFIED TRIMESTER: ICD-10-CM

## 2025-08-19 DIAGNOSIS — Z98.890 OTHER SPECIFIED POSTPROCEDURAL STATES: Chronic | ICD-10-CM

## 2025-08-19 LAB
APPEARANCE UR: CLEAR — SIGNIFICANT CHANGE UP
BILIRUB UR-MCNC: NEGATIVE — SIGNIFICANT CHANGE UP
COLOR SPEC: YELLOW — SIGNIFICANT CHANGE UP
DIFF PNL FLD: NEGATIVE — SIGNIFICANT CHANGE UP
GLUCOSE UR QL: NEGATIVE MG/DL — SIGNIFICANT CHANGE UP
KETONES UR QL: ABNORMAL MG/DL
LEUKOCYTE ESTERASE UR-ACNC: NEGATIVE — SIGNIFICANT CHANGE UP
NITRITE UR-MCNC: NEGATIVE — SIGNIFICANT CHANGE UP
PH UR: 6.5 — SIGNIFICANT CHANGE UP (ref 5–8)
PROT UR-MCNC: SIGNIFICANT CHANGE UP MG/DL
SP GR SPEC: 1.03 — SIGNIFICANT CHANGE UP (ref 1–1.03)
UROBILINOGEN FLD QL: 1 MG/DL — SIGNIFICANT CHANGE UP (ref 0.2–1)

## 2025-08-19 PROCEDURE — 81003 URINALYSIS AUTO W/O SCOPE: CPT

## 2025-08-19 PROCEDURE — G0463: CPT

## 2025-08-20 VITALS — HEART RATE: 75 BPM | SYSTOLIC BLOOD PRESSURE: 108 MMHG | DIASTOLIC BLOOD PRESSURE: 66 MMHG

## 2025-08-22 DIAGNOSIS — O99.012 ANEMIA COMPLICATING PREGNANCY, SECOND TRIMESTER: ICD-10-CM

## 2025-08-22 DIAGNOSIS — O26.892 OTHER SPECIFIED PREGNANCY RELATED CONDITIONS, SECOND TRIMESTER: ICD-10-CM

## 2025-08-22 DIAGNOSIS — R10.9 UNSPECIFIED ABDOMINAL PAIN: ICD-10-CM

## 2025-08-22 DIAGNOSIS — D64.9 ANEMIA, UNSPECIFIED: ICD-10-CM

## 2025-08-22 DIAGNOSIS — O44.22 PARTIAL PLACENTA PREVIA NOS OR WITHOUT HEMORRHAGE, SECOND TRIMESTER: ICD-10-CM

## 2025-08-22 DIAGNOSIS — O09.522 SUPERVISION OF ELDERLY MULTIGRAVIDA, SECOND TRIMESTER: ICD-10-CM

## 2025-08-22 DIAGNOSIS — Z3A.22 22 WEEKS GESTATION OF PREGNANCY: ICD-10-CM

## 2025-08-22 DIAGNOSIS — O09.42 SUPERVISION OF PREGNANCY WITH GRAND MULTIPARITY, SECOND TRIMESTER: ICD-10-CM

## 2025-08-27 ENCOUNTER — APPOINTMENT (OUTPATIENT)
Dept: OBGYN | Facility: CLINIC | Age: 35
End: 2025-08-27
Payer: COMMERCIAL

## 2025-08-27 VITALS
SYSTOLIC BLOOD PRESSURE: 100 MMHG | WEIGHT: 129 LBS | DIASTOLIC BLOOD PRESSURE: 62 MMHG | HEIGHT: 61 IN | BODY MASS INDEX: 24.35 KG/M2

## 2025-08-27 DIAGNOSIS — Z3A.23 23 WEEKS GESTATION OF PREGNANCY: ICD-10-CM

## 2025-08-27 LAB
APPEARANCE: CLEAR
BILIRUBIN URINE: NEGATIVE
BLOOD URINE: NEGATIVE
COLOR: YELLOW
GLUCOSE QUALITATIVE U: NEGATIVE
KETONES URINE: NEGATIVE
LEUKOCYTE ESTERASE URINE: ABNORMAL
NITRITE URINE: NEGATIVE
PH URINE: 6
PROTEIN URINE: NEGATIVE
SPECIFIC GRAVITY URINE: 1.01
UROBILINOGEN URINE: 0.2 (ref 0.2–?)

## 2025-08-27 PROCEDURE — 0502F SUBSEQUENT PRENATAL CARE: CPT

## 2025-09-10 ENCOUNTER — APPOINTMENT (OUTPATIENT)
Dept: OBGYN | Facility: CLINIC | Age: 35
End: 2025-09-10
Payer: COMMERCIAL

## 2025-09-10 VITALS
SYSTOLIC BLOOD PRESSURE: 108 MMHG | DIASTOLIC BLOOD PRESSURE: 80 MMHG | WEIGHT: 133.4 LBS | HEIGHT: 61 IN | BODY MASS INDEX: 25.19 KG/M2

## 2025-09-10 DIAGNOSIS — Z3A.21 21 WEEKS GESTATION OF PREGNANCY: ICD-10-CM

## 2025-09-10 DIAGNOSIS — Z3A.25 25 WEEKS GESTATION OF PREGNANCY: ICD-10-CM

## 2025-09-10 DIAGNOSIS — Z3A.17 17 WEEKS GESTATION OF PREGNANCY: ICD-10-CM

## 2025-09-10 DIAGNOSIS — Z3A.23 23 WEEKS GESTATION OF PREGNANCY: ICD-10-CM

## 2025-09-10 LAB
APPEARANCE: CLEAR
BILIRUBIN URINE: NEGATIVE
BLOOD URINE: NEGATIVE
COLOR: YELLOW
GLUCOSE QUALITATIVE U: 250
KETONES URINE: NEGATIVE
LEUKOCYTE ESTERASE URINE: ABNORMAL
NITRITE URINE: NEGATIVE
PH URINE: 5.5
PROTEIN URINE: NEGATIVE
SPECIFIC GRAVITY URINE: 1.02
UROBILINOGEN URINE: 0.2 (ref 0.2–?)

## 2025-09-10 PROCEDURE — 0502F SUBSEQUENT PRENATAL CARE: CPT

## 2025-09-11 LAB
GLUCOSE 1H P 50 G GLC PO SERPL-MCNC: 145 MG/DL
HCT VFR BLD CALC: 27.8 %
HGB BLD-MCNC: 7.3 G/DL
MCHC RBC-ENTMCNC: 18.5 PG
MCHC RBC-ENTMCNC: 26.3 G/DL
MCV RBC AUTO: 70.4 FL
PLATELET # BLD AUTO: 309 K/UL
RBC # BLD: 3.95 M/UL
RBC # FLD: 18.9 %
T PALLIDUM AB SER QL IA: NEGATIVE
WBC # FLD AUTO: 9.9 K/UL

## 2025-09-12 DIAGNOSIS — R73.09 OTHER ABNORMAL GLUCOSE: ICD-10-CM
